# Patient Record
Sex: FEMALE | Race: WHITE | NOT HISPANIC OR LATINO | Employment: FULL TIME | URBAN - METROPOLITAN AREA
[De-identification: names, ages, dates, MRNs, and addresses within clinical notes are randomized per-mention and may not be internally consistent; named-entity substitution may affect disease eponyms.]

---

## 2018-01-11 ENCOUNTER — OFFICE VISIT CONVERTED (OUTPATIENT)
Dept: INTERNAL MEDICINE | Facility: CLINIC | Age: 43
End: 2018-01-11
Attending: NURSE PRACTITIONER

## 2018-02-20 ENCOUNTER — OFFICE VISIT CONVERTED (OUTPATIENT)
Dept: INTERNAL MEDICINE | Facility: CLINIC | Age: 43
End: 2018-02-20
Attending: PHYSICIAN ASSISTANT

## 2018-04-13 ENCOUNTER — OFFICE VISIT CONVERTED (OUTPATIENT)
Dept: INTERNAL MEDICINE | Facility: CLINIC | Age: 43
End: 2018-04-13
Attending: INTERNAL MEDICINE

## 2020-06-10 ENCOUNTER — TELEMEDICINE CONVERTED (OUTPATIENT)
Dept: INTERNAL MEDICINE | Facility: CLINIC | Age: 45
End: 2020-06-10
Attending: INTERNAL MEDICINE

## 2021-02-01 ENCOUNTER — CONVERSION ENCOUNTER (OUTPATIENT)
Dept: INTERNAL MEDICINE | Facility: CLINIC | Age: 46
End: 2021-02-01

## 2021-02-01 ENCOUNTER — OFFICE VISIT CONVERTED (OUTPATIENT)
Dept: INTERNAL MEDICINE | Facility: CLINIC | Age: 46
End: 2021-02-01
Attending: INTERNAL MEDICINE

## 2021-02-01 ENCOUNTER — HOSPITAL ENCOUNTER (OUTPATIENT)
Dept: OTHER | Facility: HOSPITAL | Age: 46
Discharge: HOME | End: 2021-02-01
Attending: INTERNAL MEDICINE

## 2021-02-01 LAB
ALBUMIN SERPL-MCNC: 4.3 G/DL (ref 3.5–5)
ALBUMIN/GLOB SERPL: 1.6 {RATIO} (ref 1.4–2.6)
ALP SERPL-CCNC: 63 U/L (ref 42–98)
ALT SERPL-CCNC: 12 U/L (ref 10–40)
ANION GAP SERPL CALC-SCNC: 14 MMOL/L (ref 8–19)
AST SERPL-CCNC: 14 U/L (ref 15–50)
BASOPHILS # BLD AUTO: 0.05 10*3/UL (ref 0–0.2)
BASOPHILS NFR BLD AUTO: 0.6 % (ref 0–3)
BILIRUB SERPL-MCNC: 0.2 MG/DL (ref 0.2–1.3)
BUN SERPL-MCNC: 10 MG/DL (ref 5–25)
BUN/CREAT SERPL: 11 {RATIO} (ref 6–20)
CALCIUM SERPL-MCNC: 9 MG/DL (ref 8.7–10.4)
CHLORIDE SERPL-SCNC: 105 MMOL/L (ref 99–111)
CHOLEST SERPL-MCNC: 203 MG/DL (ref 107–200)
CHOLEST/HDLC SERPL: 3 {RATIO} (ref 3–6)
CONV ABS IMM GRAN: 0.03 10*3/UL (ref 0–0.2)
CONV CO2: 24 MMOL/L (ref 22–32)
CONV IMMATURE GRAN: 0.4 % (ref 0–1.8)
CONV TOTAL PROTEIN: 7 G/DL (ref 6.3–8.2)
CREAT UR-MCNC: 0.91 MG/DL (ref 0.5–0.9)
DEPRECATED RDW RBC AUTO: 40.6 FL (ref 36.4–46.3)
EOSINOPHIL # BLD AUTO: 0.12 10*3/UL (ref 0–0.7)
EOSINOPHIL # BLD AUTO: 1.4 % (ref 0–7)
ERYTHROCYTE [DISTWIDTH] IN BLOOD BY AUTOMATED COUNT: 12.9 % (ref 11.7–14.4)
GFR SERPLBLD BASED ON 1.73 SQ M-ARVRAT: >60 ML/MIN/{1.73_M2}
GLOBULIN UR ELPH-MCNC: 2.7 G/DL (ref 2–3.5)
GLUCOSE SERPL-MCNC: 94 MG/DL (ref 65–99)
HCT VFR BLD AUTO: 40 % (ref 37–47)
HDLC SERPL-MCNC: 68 MG/DL (ref 40–60)
HGB BLD-MCNC: 12.8 G/DL (ref 12–16)
LDLC SERPL CALC-MCNC: 100 MG/DL (ref 70–100)
LYMPHOCYTES # BLD AUTO: 2.2 10*3/UL (ref 1–5)
LYMPHOCYTES NFR BLD AUTO: 26.1 % (ref 20–45)
MCH RBC QN AUTO: 27.8 PG (ref 27–31)
MCHC RBC AUTO-ENTMCNC: 32 G/DL (ref 33–37)
MCV RBC AUTO: 86.8 FL (ref 81–99)
MONOCYTES # BLD AUTO: 0.57 10*3/UL (ref 0.2–1.2)
MONOCYTES NFR BLD AUTO: 6.8 % (ref 3–10)
NEUTROPHILS # BLD AUTO: 5.45 10*3/UL (ref 2–8)
NEUTROPHILS NFR BLD AUTO: 64.7 % (ref 30–85)
NRBC CBCN: 0 % (ref 0–0.7)
OSMOLALITY SERPL CALC.SUM OF ELEC: 287 MOSM/KG (ref 273–304)
PLATELET # BLD AUTO: 363 10*3/UL (ref 130–400)
PMV BLD AUTO: 10.1 FL (ref 9.4–12.3)
POTASSIUM SERPL-SCNC: 4.4 MMOL/L (ref 3.5–5.3)
RBC # BLD AUTO: 4.61 10*6/UL (ref 4.2–5.4)
SODIUM SERPL-SCNC: 139 MMOL/L (ref 135–147)
TRIGL SERPL-MCNC: 174 MG/DL (ref 40–150)
TSH SERPL-ACNC: 4.32 M[IU]/L (ref 0.27–4.2)
VLDLC SERPL-MCNC: 35 MG/DL (ref 5–37)
WBC # BLD AUTO: 8.42 10*3/UL (ref 4.8–10.8)

## 2021-03-24 ENCOUNTER — OFFICE VISIT CONVERTED (OUTPATIENT)
Dept: FAMILY MEDICINE CLINIC | Facility: CLINIC | Age: 46
End: 2021-03-24
Attending: PHYSICIAN ASSISTANT

## 2021-03-24 ENCOUNTER — CONVERSION ENCOUNTER (OUTPATIENT)
Dept: FAMILY MEDICINE CLINIC | Facility: CLINIC | Age: 46
End: 2021-03-24

## 2021-03-29 ENCOUNTER — HOSPITAL ENCOUNTER (OUTPATIENT)
Dept: LAB | Facility: HOSPITAL | Age: 46
Discharge: HOME | End: 2021-03-29
Attending: PHYSICIAN ASSISTANT

## 2021-03-29 LAB
ALBUMIN SERPL-MCNC: 4.4 G/DL (ref 3.5–5)
ALBUMIN/GLOB SERPL: 1.7 {RATIO} (ref 1.4–2.6)
ALP SERPL-CCNC: 49 U/L (ref 42–98)
ALT SERPL-CCNC: 15 U/L (ref 10–40)
ANION GAP SERPL CALC-SCNC: 17 MMOL/L (ref 8–19)
AST SERPL-CCNC: 17 U/L (ref 15–50)
BASOPHILS # BLD AUTO: 0.04 10*3/UL (ref 0–0.2)
BASOPHILS NFR BLD AUTO: 0.5 % (ref 0–3)
BILIRUB SERPL-MCNC: 0.24 MG/DL (ref 0.2–1.3)
BUN SERPL-MCNC: 11 MG/DL (ref 5–25)
BUN/CREAT SERPL: 12 {RATIO} (ref 6–20)
CALCIUM SERPL-MCNC: 8.9 MG/DL (ref 8.7–10.4)
CHLORIDE SERPL-SCNC: 102 MMOL/L (ref 99–111)
CONV ABS IMM GRAN: 0.02 10*3/UL (ref 0–0.2)
CONV CO2: 22 MMOL/L (ref 22–32)
CONV IMMATURE GRAN: 0.2 % (ref 0–1.8)
CONV TOTAL PROTEIN: 7 G/DL (ref 6.3–8.2)
CREAT UR-MCNC: 0.93 MG/DL (ref 0.5–0.9)
DEPRECATED RDW RBC AUTO: 41.2 FL (ref 36.4–46.3)
EOSINOPHIL # BLD AUTO: 0.1 10*3/UL (ref 0–0.7)
EOSINOPHIL # BLD AUTO: 1.2 % (ref 0–7)
ERYTHROCYTE [DISTWIDTH] IN BLOOD BY AUTOMATED COUNT: 12.9 % (ref 11.7–14.4)
FOLATE SERPL-MCNC: 15.5 NG/ML (ref 4.8–20)
GFR SERPLBLD BASED ON 1.73 SQ M-ARVRAT: >60 ML/MIN/{1.73_M2}
GLOBULIN UR ELPH-MCNC: 2.6 G/DL (ref 2–3.5)
GLUCOSE SERPL-MCNC: 82 MG/DL (ref 65–99)
HCT VFR BLD AUTO: 38 % (ref 37–47)
HGB BLD-MCNC: 12.1 G/DL (ref 12–16)
LYMPHOCYTES # BLD AUTO: 2.02 10*3/UL (ref 1–5)
LYMPHOCYTES NFR BLD AUTO: 23.8 % (ref 20–45)
MCH RBC QN AUTO: 28 PG (ref 27–31)
MCHC RBC AUTO-ENTMCNC: 31.8 G/DL (ref 33–37)
MCV RBC AUTO: 88 FL (ref 81–99)
MONOCYTES # BLD AUTO: 0.51 10*3/UL (ref 0.2–1.2)
MONOCYTES NFR BLD AUTO: 6 % (ref 3–10)
NEUTROPHILS # BLD AUTO: 5.78 10*3/UL (ref 2–8)
NEUTROPHILS NFR BLD AUTO: 68.3 % (ref 30–85)
NRBC CBCN: 0 % (ref 0–0.7)
OSMOLALITY SERPL CALC.SUM OF ELEC: 282 MOSM/KG (ref 273–304)
PLATELET # BLD AUTO: 363 10*3/UL (ref 130–400)
PMV BLD AUTO: 10.5 FL (ref 9.4–12.3)
POTASSIUM SERPL-SCNC: 3.7 MMOL/L (ref 3.5–5.3)
RBC # BLD AUTO: 4.32 10*6/UL (ref 4.2–5.4)
SODIUM SERPL-SCNC: 137 MMOL/L (ref 135–147)
T4 FREE SERPL-MCNC: 1.3 NG/DL (ref 0.9–1.8)
TSH SERPL-ACNC: 4.16 M[IU]/L (ref 0.27–4.2)
VIT B12 SERPL-MCNC: 351 PG/ML (ref 211–911)
WBC # BLD AUTO: 8.47 10*3/UL (ref 4.8–10.8)

## 2021-04-26 ENCOUNTER — OFFICE VISIT CONVERTED (OUTPATIENT)
Dept: FAMILY MEDICINE CLINIC | Facility: CLINIC | Age: 46
End: 2021-04-26
Attending: PHYSICIAN ASSISTANT

## 2021-04-26 ENCOUNTER — CONVERSION ENCOUNTER (OUTPATIENT)
Dept: FAMILY MEDICINE CLINIC | Facility: CLINIC | Age: 46
End: 2021-04-26

## 2021-04-29 ENCOUNTER — OFFICE VISIT CONVERTED (OUTPATIENT)
Dept: FAMILY MEDICINE CLINIC | Facility: CLINIC | Age: 46
End: 2021-04-29
Attending: PHYSICIAN ASSISTANT

## 2021-05-10 NOTE — H&P
"   History and Physical      Patient Name: Angelita Morgan   Patient ID: [id number]   Sex: Female   YOB: 1975    Primary Care Provider: Tiesha Weir MD    Visit Date: February 1, 2021    Provider: Tiesha Weir MD   Location: [facility]   Location Address: [address] [place] [address], [address]  [place], [place]  [id number]   Location Phone: [phone]          Chief Complaint  · est care  · HTN  · back pain      History Of Present Illness  Angelita Morgan is a 45 year old Other Race female who presents for evaluation and treatment of:      Previous PCP: \"has been a while since seen\", however is a pt of Dr. Weir, last visit June 2020  Last blood work: Does not know, put in labs for visit today  Specialists: None  LMP: 01/08/2021  PAP: Has not had  Mammogram: Has not had  Influenza vaccine: Has not had, wants to get today  Eye exam: Had one around 2019  Dental exam: Had one around 2018  Tobacco history: Former 1ppd, quit in 2017    HTN: pt says her BP has been stable around 140/80, BP at visit is 162/114, painful to take BP due to fibromyalgia, Denies vision changes, chest pain, palpitations, shortness of breath, headache. Was recently seen at urgent care and BP was 130/78 at that time.     Back pain: located in lumbar region, pt says she hurt her back on the 18th of January, went to [place] urgent care - recieved muscle relaxer and antiinflammatory, did not help so she went to the ER, received much the same treatment. pain has improved but is still present. Still taking muscle relaxer at night. Denies numbness or tingling.     Wanting to discuss pain management referal due to fibromyalgia and arthritis.  Currently taking CBD oil, IBU, Tyenol daily.     Migraines: infrequent, takes Imitrex with good relief       Past Medical History  Disease Name Date Onset Notes   Allergies --  --    Anxiety --  --    Arthritis --  --    Back injury --  --    Broken Bones --  --    Convulsions --  --  "   Depression --  --    Fibromyalgia --  --    Forgetfulness --  --    Heart murmur --  --    Hypertension --  --    Migraine --  --    Thyroid Problems --  --          Past Surgical History  Procedure Name Date Notes   Vaginal Delivery/s --  --          Medication List  Name Date Started Instructions   acetaminophen 500 mg oral capsule  take 2 capsules (1,000 mg) by oral route every 6 hours as needed   acetaminophen 500 mg oral tablet  take 1 tablet by oral route every 6 hours   Blood Pressure Cuff miscellaneous misc 06/10/2020 use as directed   ibuprofen 200 mg oral tablet  take 1 tablet (200 mg) by oral route every 6 hours as needed with food   Imitrex 50 mg oral tablet 06/10/2020 take 1 tablet (50 mg) by oral route after onset of migraine; may repeat after 2 hours if headache returns, not to exceed 200mg in 24hrs   lisinopril 20 mg oral tablet 2021 take 1 tablet (20 mg) by oral route once daily for 30 days   loratadine 10 mg oral tablet  take 1 tablet (10 mg) by oral route once daily   melatonin 10 mg oral tablet  take 1 tablet by oral route once a day (at bedtime)   Vitamin C 500 mg oral tablet  take 1 tablet by oral route daily         Allergy List  Allergen Name Date Reaction Notes   clindamycin HCl --  --  --    Latex --  --  --    PENICILLINS --  --  --        Allergies Reconciled  Family Medical History  Disease Name Relative/Age Notes   Depression  --    Stroke  --    Alzheimer's Disease  --    Lung cancer  --    Ovarian Cancer, Family History  --    ADHD  --    Diabetes  --          Reproductive History  Menstrual   Pregnancy Summary   Total Pregnancies: 4 Full Term: 4 Premature: 0   Ab Induced: 0 Ab Spontaneous: 0 Ectopics: 0   Multiples: 0 Livin         Social History  Finding Status Start/Stop Quantity Notes   Tobacco Former  .25ppd quit           Immunizations  NameDate Admin Mfg Trade Name Lot Number Route Inj VIS Given VIS Publication   Pcyhjbmuc79/01/2021 [employer] Fluzone Quadrivalent  [id number] [facility] 02/01/2021 08/15/2019   Comments: pt tolerated well, left office in stable condition         Review of Systems  · Constitutional  o * See HPI  · Eyes  o * See HPI  · HENT  o * See HPI  · Breasts  o * See HPI  · Cardiovascular  o * See HPI  · Respiratory  o * See HPI  · Gastrointestinal  o * See HPI  · Genitourinary  o * See HPI  · Integument  o * See HPI  · Neurologic  o * See HPI  · Musculoskeletal  o * See HPI  · Endocrine  o * See HPI  · Psychiatric  o * See HPI  · Heme-Lymph  o * See HPI  · Allergic-Immunologic  o * See HPI      Vitals  Date Time BP Position Site L\R Cuff Size HR RR TEMP (F) WT  HT  BMI kg/m2 BSA m2 O2 Sat FR L/min FiO2 HC       02/20/2018 08:20 /130 Sitting                 04/13/2018 09:47 /60 Sitting    67 - R 16 99.3 262lbs 0oz 5'  9\" 38.69 2.41 99 %      02/01/2021 09:03 /114 Sitting    71 - R  97.6 284lbs 6oz 5'  9\" 41.99 2.51 98 %      02/01/2021 09:48 /106 Sitting                       Physical Examination  · Constitutional  o Appearance  o : no acute distress, well-nourished  · Head and Face  o Head  o :   § Inspection  § : atraumatic, normocephalic  · Eyes  o Eyes  o : extraocular movements intact, no scleral icterus, no conjunctival injection  · Ears, Nose, Mouth and Throat  o Ears  o :   § External Ears  § : normal  o Nose  o :   § Intranasal Exam  § : nares patent  o Oral Cavity  o :   § Oral Mucosa  § : moist mucous membranes  · Respiratory  o Respiratory Effort  o : breathing comfortably, symmetric chest rise  o Auscultation of Lungs  o : clear to asculatation bilaterally, no wheezes, rales, or rhonchii  · Cardiovascular  o Heart  o :   § Auscultation of Heart  § : regular rate and rhythm, no murmurs, rubs, or gallops  o Peripheral Vascular System  o :   § Extremities  § : no edema  · Neurologic  o Mental Status Examination  o :   § Orientation  § : grossly oriented to person, place and time  o Gait and Station  o :   § Gait Screening  § : " normal gait  · Psychiatric  o General  o : normal mood and affect              Assessment  · Screening for depression     V79.0/Z13.89  positive screen  patient attributes symptoms to chronic pain, does not wish to consider counseling or medication at this time  · Need for influenza vaccination     V04.81/Z23  · Anxiety     300.02/F41.1  · Fibromyalgia     729.1/M79.7  will refer to pain management per patient request  · Hypertension     401.9/I10  Elevated today in clinic, patient denies symptoms at this time, so will hold off on treating with clonidine  Will increase Lisinopril dose   Patient to monitor BP at home and call with continued elevated readings  patient to call clinic or go to ER if she develops any symptoms of HTN urgency  · Migraine     346.10/G43.009  well controlled  continue Imitrex as needed  · Chronic pain     338.29/G89.29      Plan  · Orders  o Immunization Admin Fee (Single) ([facility]) (80767) - V04.81/Z23 - 02/01/2021  o Fluzone Quadrivalent Vaccine, age 6 months + (67054) - V04.81/Z23 - 02/01/2021  o Physical, Primary Care Panel (CBC, CMP, Lipid, TSH) [facility] (97570, 41395, 44778, 28595) - 729.1/M79.7, 401.9/I10, 338.29/G89.29, 346.10/G43.009 - 02/01/2021  o ACO-39: Current medications updated and reviewed (1159F, ) - - 02/01/2021  o PAIN MANAGEMENT CONSULTATION (PAINM) - 729.1/M79.7, 338.29/G89.29 - 02/01/2021  · Medications  o lisinopril 20 mg oral tablet   SIG: take 1 tablet (20 mg) by oral route once daily for 30 days   DISP: (30) Tablet with 2 refills  Adjusted on 02/01/2021     o Medications have been Reconciled  o Transition of Care or Provider Policy  · Instructions  o Depression Screen completed and scanned into the EMR under the designated folder within the patient's documents.  o Today's PHQ-9 result is _15__  o Take all medications as prescribed/directed.  o Patient was educated/instructed on their diagnosis, treatment and medications prior to discharge from the clinic today.  o Call  the office with any concerns or questions.  · Disposition  o Follow up in 1 month  o Labs drawn in clinic  o Prescriptions sent electronically            Electronically Signed by: Tiesha Weir MD -Author on February 3, 2021 08:31:57 AM

## 2021-05-10 NOTE — H&P
"   History and Physical      Patient Name: Angelita Morgan   Patient ID: [id number]   Sex: Female   YOB: 1975    Primary Care Provider: Pat BARRERA   Referring Provider: Tiesha Weir MD    Visit Date: March 24, 2021    Provider: BRUCE Marie   Location: [facility]   Location Address: [address] [place] [address], [address]  [place], [place]  [id number]   Location Phone: [phone]          Chief Complaint  · New Patient-Establish Care       History Of Present Illness  Angelita Morgan is a 45 year old /White female who presents for evaluation and treatment of:      New Patient to establish care, previous PCP Tiesha Weir     She would like to discuss weight loss; weight gain of about 20 lb since 2018. Recently lost 10 lbs per pt. \"tries to eat healthy but hard due to work schedule\"    She is also c/o bilateral hand dryness that are cracking and bleeding. She states she has tried lotions and sauves without improvement. Sprays with hydrogen peroxide to help with healing. Sx x 2 years. Itching.    She is also concerned about her memory, she states she has been forgetting stuff alot    HTN: She is taking Lisinopril 10mg daily but states she feels like it hasn't been working. She has not been checking at home, today in office it is 186/96. She denies CP/SOA/Headache but states she does have edema in feet/ankles. She states she stands on her feet alot at work     AR: She is taking Loratidine with good results    Fibromyalgia: She is taking Acetaminophen and Ibuprofen daily and also uses CBD oil. She states she does not see anyone for management of it. She is wanting to discuss her pain today. Has referral to pain mgt pending from previous provider. Took medication in past unsure of name but had \"horrible side effects'    Mammo: Scheduled for 08/11/2021 [facility] @ 645pm   Labs: 02/01/2021, TSH elevated.  Pap: Unsure   PHQ: 15; pt states that she doesn't feel depressed " but overwhelmed with medical issues.           Past Medical History  Disease Name Date Onset Notes   Allergies --  --    Anxiety --  --    Arthritis --  --    Back injury --  --    Broken Bones --  --    Convulsions --  --    Depression --  --    Fibromyalgia --  --    Forgetfulness --  --    Heart murmur --  --    Hypertension --  --    Migraine --  --    Thyroid Problems --  --          Past Surgical History  Procedure Name Date Notes   Vaginal Delivery/s --  --          Allergy List  Allergen Name Date Reaction Notes   clindamycin HCl --  --  --    Latex --  --  --    PENICILLINS --  --  --        Allergies Reconciled  Family Medical History  Disease Name Relative/Age Notes   Depression  --    Stroke  --    Alzheimer's Disease  --    Lung cancer  --    Ovarian Cancer, Family History  --    ADHD  --    Diabetes  --          Reproductive History  Menstrual   Pregnancy Summary   Total Pregnancies: 4 Full Term: 4 Premature: 0   Ab Induced: 0 Ab Spontaneous: 0 Ectopics: 0   Multiples: 0 Livin         Social History  Finding Status Start/Stop Quantity Notes   Tobacco Former  .25ppd quit           Immunizations  NameDate Admin Mfg Trade Name Lot Number Route Inj VIS Given VIS Publication   Nbnjkwyev30/01/2021 [facility] Fluzone Quadrivalent [id number] IM RD 2021 08/15/2019   Comments: pt tolerated well, left office in stable condition         Review of Systems  · Constitutional  o Admits  o : weight gain  o Denies  o : fatigue, night sweats  · Eyes  o Denies  o : double vision, blurred vision  · HENT  o Denies  o : vertigo, recent head injury  · Breasts  o Denies  o : abnormal changes in breast size, additional breast symptoms except as noted in the HPI  · Cardiovascular  o Denies  o : chest pain, irregular heart beats  · Respiratory  o Denies  o : shortness of breath, productive cough  · Gastrointestinal  o Denies  o : nausea, vomiting  · Genitourinary  o Denies  o : dysuria, urinary  "retention  · Integument  o Admits  o : dry skin  o Denies  o : hair growth change, new skin lesions  · Neurologic  o Admits  o : Chronic pain  o Denies  o : altered mental status, seizures  · Musculoskeletal  o Denies  o : joint swelling, limitation of motion  · Endocrine  o Denies  o : cold intolerance, heat intolerance  · Psychiatric  o Admits  o : memory deficit  o Denies  o : depression  · Heme-Lymph  o Denies  o : petechiae, lymph node enlargement or tenderness  · Allergic-Immunologic  o Denies  o : frequent illnesses      Vitals  Date Time BP Position Site L\R Cuff Size HR RR TEMP (F) WT  HT  BMI kg/m2 BSA m2 O2 Sat FR L/min FiO2 HC       03/24/2021 09:37 /96 Sitting    81 - R  98.2 282lbs 2oz 5'  9\" 41.66 2.5 100 %  21%    03/24/2021 04:12 /90 Sitting                       Physical Examination  · Constitutional  o Appearance  o : well developed, well-nourished, obese in no acute distress  · Head and Face  o Head  o : normocephalic, atraumatic  · Neck  o Inspection/Palpation  o : normal appearance, no masses or tenderness, trachea midline  o Thyroid  o : gland size normal, nontender, no nodules or masses present on palpation  · Respiratory  o Respiratory Effort  o : breathing unlabored  o Inspection of Chest  o : chest rise symmetric bilaterally  o Auscultation of Lungs  o : clear to auscultation bilaterally throughout inspiration and expiration  · Cardiovascular  o Heart  o :   § Auscultation of Heart  § : regular rate and rhythm, murmur noted, gallops or rubs  o Peripheral Vascular System  o :   § Extremities  § : no edema  · Lymphatic  o Neck  o : no cervical lymphadenopathy, no supraclavicular lymphadenopathy  · Psychiatric  o Mood and Affect  o : mood normal, affect appropriate              Assessment  · Screening for depression     V79.0/Z13.89  · Allergic rhinitis due to allergen     477.9/J30.9  · Obesity     278.00/E66.9  · Fibromyalgia     729.1/M79.7  Referral to pain " management.  · Heart murmur     785.2/R01.1  · Hypertension     401.9/I10  · Patient receiving medication management services from refill clinic     V68.1/Z76.0  · Abnormal TSH     790.6/R79.89  · Weight gain     783.1/R63.5  · Dry skin     701.1/L85.3  · Memory deficit     780.93/R41.3       Increase Lisinopril to 20mg qd; f/u 1 mth to recheck.  Check labs; pt had elevated TSH; recheck with thyroid profile and b12.     Problems Reconciled  Plan  · Orders  o CBC with Auto Diff [facility] (71401) - 401.9/I10, 790.6/R79.89, 783.1/R63.5 - 03/24/2021  o CMP [facility] (63873) - 401.9/I10 - 03/24/2021  o Thyroid Profile (77707, 25875, THYII) - 401.9/I10, 278.00/E66.9, 790.6/R79.89, 783.1/R63.5 - 03/24/2021  o ACO-18: Positive screen for clinical depression using a standardized tool and a follow-up plan documented () - - 03/24/2021   Checking labs; will f/u 1 mth.  o ACO-14: Influenza immunization administered or previously received [facility] () - - 03/24/2021  o ACO-39: Current medications updated and reviewed (, 1159F) - - 03/24/2021  o B12 Folate levels (B12FO) - 278.00/E66.9, 780.93/R41.3 - 03/24/2021  o PAIN MANAGEMENT CONSULTATION (PAINM) - 729.1/M79.7 - 03/24/2021  · Medications  o lisinopril 20 mg oral tablet   SIG: take 1 tablet (20 mg) by oral route once daily for 30 days   DISP: (30) Tablet with 1 refills  Adjusted on 03/24/2021     o Medications have been Reconciled  o Transition of Care or Provider Policy  · Instructions  o Depression Screen completed and scanned into the EMR under the designated folder within the patient's documents.  o Today's PHQ-9 result is 15  o Take all medications as prescribed/directed.  o Patient was educated/instructed on their diagnosis, treatment and medications prior to discharge from the clinic today.  o Patient instructed to seek medical attention urgently for new or worsening symptoms.  o Call the office with any concerns or questions.  o Chronic conditions reviewed and taken into  consideration for today's treatment plan.  o Discussed Covid-19 precautions including, but not limited to, social distancing, avoid touching your face, and hand washing.   o Electronically Identified Patient Education Materials Provided Electronically  · Disposition  o Follow Up 1 month.            Electronically Signed by: BRUCE Marie -Author on March 24, 2021 04:24:30 PM

## 2021-05-13 NOTE — PROGRESS NOTES
"   Progress Note      Patient Name: Angelita Morgan   Patient ID: [id number]   Sex: Female   YOB: 1975    Primary Care Provider: Tiesha Weir MD    Visit Date: Kamryn 10, 2020    Provider: Tiesha Weir MD   Location: [facility]   Location Address: [address] [place] [address], [facility] [address]  [place], [place]  [id number]   Location Phone: [phone]          Chief Complaint  · \"wants to get bp meds started again\"  · \"150/90\"       History Of Present Illness  TELEHEALTH TELEPHONE VISIT  Chief Complaint: elevated blood pressure, migraine   Angelita Morgan is a 45 year old Other Race female who is presenting for evaluation via telehealth telephone visit. Verbal consent obtained before beginning visit.   Provider spent 11 minutes with the patient during the telehealth visit.   The following staff were present during this visit: Tiesha Weir MD   Past Medical History/ Overview of Patient Symptoms     unsure of when last pap and mammo were  also, states felt better on bp meds, just never got them refilled    for the past 3 months has been noticing elevated blood pressure, running 150/90  checking at home every 3 days, now BP cuff is broken, needs new one  previously on Lisinopril, tolerated well    C/O migraines, has had multiple ER visits, has never been on medication for this       Past Medical History  Disease Name Date Onset Notes   Allergies --  --    Anxiety --  --    Arthritis --  --    Back injury --  --    Broken Bones --  --    Convulsions --  --    Depression --  --    Fibromyalgia --  --    Forgetfulness --  --    Heart murmur --  --    Hypertension --  --    Migraine --  --    Thyroid Problems --  --          Past Surgical History  Procedure Name Date Notes   Vaginal Delivery/s --  --          Medication List  Name Date Started Instructions   ibuprofen 800 mg oral tablet 07/24/2017 take 1 tablet (800 mg) by oral route 3 times per day with food   Vitamin C 500 mg oral tablet  take 1 " tablet by oral route daily   Zyrtec 10 mg oral tablet 2018 take 1 tablet (10 mg) by oral route once daily for 30 days         Allergy List  Allergen Name Date Reaction Notes   clindamycin HCl --  --  --    Latex --  --  --    PENICILLINS --  --  --          Family Medical History  Disease Name Relative/Age Notes   Depression  --    Stroke  --    Alzheimer's Disease  --    Ovarian Cancer, Family History  --    ADHD  --    Diabetes  --          Reproductive History  Menstrual   Pregnancy Summary   Total Pregnancies: 4 Full Term: 4 Premature: 0   Ab Induced: 0 Ab Spontaneous: 0 Ectopics: 0   Multiples: 0 Livin         Social History  Finding Status Start/Stop Quantity Notes   Tobacco Former  .25ppd --          Immunizations  NameDate Admin Mfg Trade Name Lot Number Route Inj VIS Given VIS Publication   Znveehsta10/2017 [employer] Fluarix, quadrivalent, preservative free [id number] IM LD 10/04/2017 2014   Comments: Patient tolerated well, left office in stable condition.         Review of Systems  · Constitutional  o Denies  o : fatigue, fever, weight gain, weight loss, chills  · Eyes  o Denies  o : changes in vision  · HENT  o Admits  o : headaches  o Denies  o : ear pain, sore throat  · Cardiovascular  o Denies  o : chest Pain, palpitations, edema (swelling)  · Respiratory  o Denies  o : frequent cough, shortness of breath  · Gastrointestinal  o Denies  o : nausea, vomiting, changes in bowel habits      Physical Examination                Assessment  · Hypertension     401.9/I10  restart Lisinopril  advised to check BP at home  follow up in 1 month  · Migraine     346.10/G43.009  will send in Imitrex  · Encounter for screening mammogram for breast cancer         Problems Reconciled  Plan  · Orders  o Physician Telephone Evaluation, 11-20 minutes (24687) - , 401.9/I10, 346.10/G43.009 - 06/10/2020  o Screening Mammogram 3D Bilateral (68599, 56916, ) -  -  06/10/2020  · Medications  o lisinopril 10 mg oral tablet   SIG: take 1 tablet (10 mg) by oral route once daily   DISP: (30) tablets with 1 refills  Prescribed on 06/10/2020     o Blood Pressure Cuff miscellaneous misc   SIG: use as directed   DISP: (1) Box with 0 refills  Prescribed on 06/10/2020     o Imitrex 50 mg oral tablet   SIG: take 1 tablet (50 mg) by oral route after onset of migraine; may repeat after 2 hours if headache returns, not to exceed 200mg in 24hrs   DISP: (9) tablets with 1 refills  Adjusted on 06/10/2020     o Medications have been Reconciled  o Transition of Care or Provider Policy  · Instructions  o Plan Of Care:   o Take all medications as prescribed/directed.  o Call the office with any concerns or questions.  · Disposition  o Follow up in 1 month  o Prescriptions sent electronically  o Radiology Order to be scheduled            Electronically Signed by: Tiesha Weir MD -Author on Kamryn 10, 2020 02:37:38 PM

## 2021-05-14 VITALS
HEIGHT: 69 IN | DIASTOLIC BLOOD PRESSURE: 114 MMHG | TEMPERATURE: 97.6 F | SYSTOLIC BLOOD PRESSURE: 162 MMHG | BODY MASS INDEX: 42.12 KG/M2 | WEIGHT: 284.37 LBS | HEART RATE: 71 BPM | OXYGEN SATURATION: 98 %

## 2021-05-14 VITALS
BODY MASS INDEX: 41.36 KG/M2 | OXYGEN SATURATION: 98 % | WEIGHT: 279.25 LBS | HEIGHT: 69 IN | DIASTOLIC BLOOD PRESSURE: 100 MMHG | HEART RATE: 108 BPM | TEMPERATURE: 98.5 F | SYSTOLIC BLOOD PRESSURE: 165 MMHG

## 2021-05-14 VITALS
OXYGEN SATURATION: 99 % | HEIGHT: 69 IN | TEMPERATURE: 98.8 F | BODY MASS INDEX: 41.4 KG/M2 | DIASTOLIC BLOOD PRESSURE: 90 MMHG | WEIGHT: 279.5 LBS | SYSTOLIC BLOOD PRESSURE: 140 MMHG | HEART RATE: 81 BPM

## 2021-05-14 VITALS
HEIGHT: 69 IN | OXYGEN SATURATION: 100 % | TEMPERATURE: 98.2 F | WEIGHT: 282.12 LBS | HEART RATE: 81 BPM | DIASTOLIC BLOOD PRESSURE: 96 MMHG | SYSTOLIC BLOOD PRESSURE: 186 MMHG | BODY MASS INDEX: 41.79 KG/M2

## 2021-05-14 VITALS — SYSTOLIC BLOOD PRESSURE: 134 MMHG | DIASTOLIC BLOOD PRESSURE: 90 MMHG

## 2021-05-14 VITALS — DIASTOLIC BLOOD PRESSURE: 98 MMHG | SYSTOLIC BLOOD PRESSURE: 138 MMHG

## 2021-05-14 NOTE — PROGRESS NOTES
Progress Note      Patient Name: Angelita Morgan   Patient ID: [id number]   Sex: Female   YOB: 1975    Primary Care Provider: Pat BARRERA   Referring Provider: Tiesha Weir MD    Visit Date: April 29, 2021    Provider: BRUCE Marie   Location: [facility]   Location Address: [address] [place] [address], [address]  [place], [place]  [id number]   Location Phone: [phone]          Chief Complaint  · follow up       History Of Present Illness  Angelita Morgan is a 46 year old /White female who presents for evaluation and treatment of:      patient is here today to follow up on cellulitis of right index finger. She states it is much better and no additional concerns today; she is ready to return to work tomorrow. She was given 7 days of Keflex and will finish on Saturday.    HTN: increased Lisinopril to 40mg at last ov on Monday; bp still elevated today at 140/90; recheck was 150/90. Patient denies CP, SOA and edema.       Past Medical History  Disease Name Date Onset Notes   Anxiety --  --    Arthritis --  --    Essential hypertension 04/26/2021 --    Fibromyalgia --  --    Heart murmur --  --    Migraine --  --          Past Surgical History  Procedure Name Date Notes   Vaginal Delivery/s --  --          Medication List  Name Date Started Instructions   acetaminophen 500 mg oral capsule  take 1 - 2 capsules (500 - 1,000 mg) by oral route every 4-6 hours as needed not to exceed 8 capsules per 24hrs   cephalexin 500 mg oral capsule  take 1 capsule (500 mg) by oral route every 6 hours for 7 days   ibuprofen 200 mg oral tablet  take 1 tablet (200 mg) by oral route every 6 hours as needed with food   lisinopril 40 mg oral tablet 04/26/2021 take 1 tablet (40 mg) by oral route once daily for 30 days   loratadine 10 mg oral tablet  take 1 tablet (10 mg) by oral route once daily   melatonin 10 mg oral tablet  take 1 tablet by oral route once a day (at bedtime)  "        Allergy List  Allergen Name Date Reaction Notes   clindamycin HCl --  --  --    Latex --  --  --    PENICILLINS --  --  --        Allergies Reconciled  Family Medical History  Disease Name Relative/Age Notes   Depression  --    Stroke  --    Alzheimer's Disease  --    Lung cancer  --    Ovarian Cancer, Family History  --    ADHD  --    Diabetes  --          Reproductive History  Menstrual   Pregnancy Summary   Total Pregnancies: 4 Full Term: 4 Premature: 0   Ab Induced: 0 Ab Spontaneous: 0 Ectopics: 0   Multiples: 0 Livin         Social History  Finding Status Start/Stop Quantity Notes   Tobacco Former  .25ppd quit           Immunizations  NameDate Admin Mfg Trade Name Lot Number Route Inj VIS Given VIS Publication   Ydaiahlit26/01/2021 [facility] Fluzone Quadrivalent [id number] IM RD 2021 08/15/2019   Comments: pt tolerated well, left office in stable condition         Review of Systems  · Constitutional  o Denies  o : fever, fatigue, weight loss, weight gain  · Cardiovascular  o Denies  o : lower extremity edema, claudication, chest pressure, palpitations  · Respiratory  o Denies  o : shortness of breath, wheezing, cough, hemoptysis, dyspnea on exertion  · Gastrointestinal  o Denies  o : nausea, vomiting, diarrhea, constipation, abdominal pain      Vitals  Date Time BP Position Site L\R Cuff Size HR RR TEMP (F) WT  HT  BMI kg/m2 BSA m2 O2 Sat FR L/min FiO2 HC       2021 09:27 /90 Sitting    81 - R  98.8 279lbs 8oz 5'  9\" 41.27 2.48 99 %  21%    2021 09:40 /90 Sitting                       Physical Examination  · Constitutional  o Appearance  o : well developed, well-nourished, obese in no acute distress  · Head and Face  o Head  o : normocephalic, atraumatic  · Neck  o Inspection/Palpation  o : normal appearance, no masses or tenderness, trachea midline  o Thyroid  o : gland size normal, nontender, no nodules or masses present on palpation  · Respiratory  o Respiratory " Effort  o : breathing unlabored  o Inspection of Chest  o : chest rise symmetric bilaterally  o Auscultation of Lungs  o : clear to auscultation bilaterally throughout inspiration and expiration  · Cardiovascular  o Heart  o :   § Auscultation of Heart  § : regular rate and rhythm, no murmurs, gallops or rubs  o Peripheral Vascular System  o :   § Extremities  § : no edema  · Lymphatic  o Neck  o : no cervical lymphadenopathy, no supraclavicular lymphadenopathy  · Psychiatric  o Mood and Affect  o : mood normal, affect appropriate     right 2nd digit: still with redness and swelling but much improved; good range of motion; cap refill normal.           Assessment  · Essential hypertension     401.9/I10  Continue current bp medication and f/u in 1mth.  · Cellulitis     682.9/L03.90  Extend Keflex 500mg bid for 3 additional days; ok to return to work.    Problems Reconciled  Plan  · Orders  o ACO-39: Current medications updated and reviewed (, 1159F) - - 04/29/2021  · Medications  o Keflex 500 mg oral capsule   SIG: take 1 capsule (500 mg) by oral route every 12 hours for 3 days   DISP: (6) Capsule with 0 refills  Prescribed on 04/29/2021     o Medications have been Reconciled  o Transition of Care or Provider Policy  · Instructions  o Patient advised to monitor blood pressure (B/P) at home and journal readings. Patient informed that a B/P reading at home of more than 130/80 is considered hypertension. For readings greater tvgl504/90 or higher patient is advised to follow up in the office with readings for management. Patient advised to limit sodium intake.  o Patient is taking medications as prescribed and doing well.   o Patient was educated/instructed on their diagnosis, treatment and medications prior to discharge from the clinic today.  o Call the office with any concerns or questions.  o Chronic conditions reviewed and taken into consideration for today's treatment plan.  o Electronically Identified Patient  Education Materials Provided Electronically  · Disposition  o Follow Up 1 month.            Electronically Signed by: BRUCE Marie -Author on April 29, 2021 09:43:18 AM

## 2021-05-14 NOTE — PROGRESS NOTES
Progress Note      Patient Name: Angelita Morgan   Patient ID: [id number]   Sex: Female   YOB: 1975    Primary Care Provider: Pat BARRERA   Referring Provider: Tiesha Weir MD    Visit Date: April 26, 2021    Provider: BRUCE Marie   Location: [facility]   Location Address: [address] [place] [address], [address]  [place], [place]  [id number]   Location Phone: [phone]          Chief Complaint  · follow up   · medication refill       History Of Present Illness  Angelita Morgan is a 45 year old /White female who presents for evaluation and treatment of:      patient is here today for follow up and medication refill     She also was seen at [facility] ER for right index finger cellullitis. She is currently on Cephalexin. Thinks that her sx started after a scratch on her finger. ER records and xray reviewed. Pt is using heat and soaks. She works at [employer] and needs time off.    HTN: She is taking Lisinopril 20mg (increased from 10mg at last ov) and doing ok. She does not check her BP at home due to it hurting too bad from her Fibromyalgia. Today in office it is 165/100; recheck is 138/98. She denies CP/SOA/Edema but states she had a migraine on Saturday.     AR: She is taking Loratidine with good results     Fibromyalgia: She is taking Tylenol and Ibruprofen daily and uses CBD oil.     Labs reviewed: B12 borderline low. States that she tried OTC b12 for 1 day and had generalized pain therefore stopped.           Past Medical History  Disease Name Date Onset Notes   Anxiety --  --    Arthritis --  --    Fibromyalgia --  --    Heart murmur --  --    Migraine --  --          Past Surgical History  Procedure Name Date Notes   Vaginal Delivery/s --  --          Medication List  Name Date Started Instructions   acetaminophen 500 mg oral capsule  take 1 - 2 capsules (500 - 1,000 mg) by oral route every 4-6 hours as needed not to exceed 8 capsules per 24hrs   cephalexin 500  mg oral capsule  take 1 capsule (500 mg) by oral route every 6 hours for 7 days   ibuprofen 200 mg oral tablet  take 1 tablet (200 mg) by oral route every 6 hours as needed with food   lisinopril 20 mg oral tablet 2021 take 1 tablet (20 mg) by oral route once daily for 30 days   loratadine 10 mg oral tablet  take 1 tablet (10 mg) by oral route once daily   melatonin 10 mg oral tablet  take 1 tablet by oral route once a day (at bedtime)         Allergy List  Allergen Name Date Reaction Notes   clindamycin HCl --  --  --    Latex --  --  --    PENICILLINS --  --  --        Allergies Reconciled  Family Medical History  Disease Name Relative/Age Notes   Depression  --    Stroke  --    Alzheimer's Disease  --    Lung cancer  --    Ovarian Cancer, Family History  --    ADHD  --    Diabetes  --          Reproductive History  Menstrual   Pregnancy Summary   Total Pregnancies: 4 Full Term: 4 Premature: 0   Ab Induced: 0 Ab Spontaneous: 0 Ectopics: 0   Multiples: 0 Livin         Social History  Finding Status Start/Stop Quantity Notes   Tobacco Former  .25ppd quit           Immunizations  NameDate Admin Mfg Trade Name Lot Number Route Inj VIS Given VIS Publication   Rayxoredo53/01/2021 [facility] Fluzone Quadrivalent [id number] IM RD 2021 08/15/2019   Comments: pt tolerated well, left office in stable condition         Review of Systems  · Constitutional  o Denies  o : fever, fatigue, weight loss, weight gain  · Cardiovascular  o Denies  o : lower extremity edema, claudication, chest pressure, palpitations  · Respiratory  o Denies  o : shortness of breath, wheezing, cough, hemoptysis, dyspnea on exertion  · Gastrointestinal  o Denies  o : nausea, vomiting, diarrhea, constipation, abdominal pain  · Integument  o Admits  o : new skin lesions      Vitals  Date Time BP Position Site L\R Cuff Size HR RR TEMP (F) WT  HT  BMI kg/m2 BSA m2 O2 Sat FR L/min FiO2 HC       2021 08:34 /100 Sitting    108 - R   "98.5 279lbs 4oz 5'  9\" 41.24 2.48 98 %  21%    04/26/2021 04:10 /98 Sitting                       Physical Examination  · Constitutional  o Appearance  o : well developed, well-nourished, no acute distress  · Head and Face  o Head  o : normocephalic, atraumatic  · Neck  o Inspection/Palpation  o : normal appearance, no masses or tenderness, trachea midline  o Thyroid  o : gland size normal, nontender, no nodules or masses present on palpation  · Respiratory  o Respiratory Effort  o : breathing unlabored  o Inspection of Chest  o : chest rise symmetric bilaterally  o Auscultation of Lungs  o : clear to auscultation bilaterally throughout inspiration and expiration  · Cardiovascular  o Heart  o :   § Auscultation of Heart  § : regular rate and rhythm, no murmurs, gallops or rubs  o Peripheral Vascular System  o :   § Extremities  § : no edema  · Lymphatic  o Neck  o : no cervical lymphadenopathy, no supraclavicular lymphadenopathy  · Psychiatric  o Mood and Affect  o : mood normal, affect appropriate     Right index finger red and swollen with pustules; decreased range of motion. Good cap refill.           Assessment  · Essential hypertension     401.9/I10  Increase Lisinopril to 40mg qd; f/u in 1mth. Decrease sodium in diet. Work on diet and weight loss.  · Fibromyalgia     729.1/M79.7  Stable.  · Patient receiving medication management services from refill clinic     V68.1/Z76.0  · Cellulitis     682.9/L03.90  Continue antibiotic. Off work til Thursday; will recheck then.  · B12 deficiency     266.2/E53.8  Restart b12 1000mcg Sublingual qd.    Problems Reconciled  Plan  · Orders  o ACO-39: Current medications updated and reviewed (, 1159F) - - 04/26/2021  · Medications  o lisinopril 40 mg oral tablet   SIG: take 1 tablet (40 mg) by oral route once daily for 30 days   DISP: (30) Tablet with 1 refills  Adjusted on 04/26/2021     o Medications have been Reconciled  o Transition of Care or Provider " Policy  · Instructions  o Patient advised to monitor blood pressure (B/P) at home and journal readings. Patient informed that a B/P reading at home of more than 130/80 is considered hypertension. For readings greater thbp836/90 or higher patient is advised to follow up in the office with readings for management. Patient advised to limit sodium intake.  o Take all medications as prescribed/directed.  o Patient was educated/instructed on their diagnosis, treatment and medications prior to discharge from the clinic today.  o Chronic conditions reviewed and taken into consideration for today's treatment plan.  o Electronically Identified Patient Education Materials Provided Electronically  · Disposition  o Follow Up 1 week.            Electronically Signed by: BRUCE Marie -Author on April 26, 2021 04:16:29 PM

## 2021-05-16 VITALS
DIASTOLIC BLOOD PRESSURE: 90 MMHG | BODY MASS INDEX: 39.25 KG/M2 | WEIGHT: 265 LBS | RESPIRATION RATE: 15 BRPM | HEART RATE: 70 BPM | OXYGEN SATURATION: 98 % | TEMPERATURE: 98.9 F | SYSTOLIC BLOOD PRESSURE: 138 MMHG | HEIGHT: 69 IN

## 2021-05-16 VITALS
TEMPERATURE: 99.3 F | BODY MASS INDEX: 38.8 KG/M2 | RESPIRATION RATE: 16 BRPM | DIASTOLIC BLOOD PRESSURE: 60 MMHG | WEIGHT: 262 LBS | OXYGEN SATURATION: 99 % | HEART RATE: 67 BPM | HEIGHT: 69 IN | SYSTOLIC BLOOD PRESSURE: 136 MMHG

## 2021-05-16 VITALS
HEIGHT: 66 IN | WEIGHT: 268 LBS | SYSTOLIC BLOOD PRESSURE: 164 MMHG | OXYGEN SATURATION: 100 % | HEART RATE: 74 BPM | TEMPERATURE: 98.8 F | BODY MASS INDEX: 43.07 KG/M2 | DIASTOLIC BLOOD PRESSURE: 116 MMHG | RESPIRATION RATE: 16 BRPM

## 2021-06-11 DIAGNOSIS — I10 ESSENTIAL HYPERTENSION: Primary | ICD-10-CM

## 2021-06-11 PROBLEM — G43.909 HEADACHE, MIGRAINE: Status: ACTIVE | Noted: 2021-06-11

## 2021-06-11 PROBLEM — M79.7 FIBROMYALGIA: Status: ACTIVE | Noted: 2021-06-11

## 2021-06-11 PROBLEM — R01.1 HEART MURMUR: Status: ACTIVE | Noted: 2021-06-11

## 2021-06-11 PROBLEM — M19.90 ARTHRITIS: Status: ACTIVE | Noted: 2021-06-11

## 2021-06-11 PROBLEM — F41.9 ANXIETY: Status: ACTIVE | Noted: 2021-06-11

## 2021-06-11 RX ORDER — LISINOPRIL 40 MG/1
TABLET ORAL
Qty: 30 TABLET | Refills: 1 | Status: SHIPPED | OUTPATIENT
Start: 2021-06-11 | End: 2021-07-12

## 2021-06-11 RX ORDER — LISINOPRIL 40 MG/1
TABLET ORAL
COMMUNITY
Start: 2021-05-19 | End: 2021-06-11 | Stop reason: SDUPTHER

## 2021-06-23 RX ORDER — PHENOL 1.4 %
1 AEROSOL, SPRAY (ML) MUCOUS MEMBRANE DAILY
COMMUNITY
End: 2021-10-11

## 2021-06-23 RX ORDER — LORATADINE 10 MG/1
1 TABLET ORAL DAILY
COMMUNITY

## 2021-06-24 ENCOUNTER — OFFICE VISIT (OUTPATIENT)
Dept: FAMILY MEDICINE CLINIC | Facility: CLINIC | Age: 46
End: 2021-06-24

## 2021-06-24 VITALS
TEMPERATURE: 97.7 F | HEART RATE: 71 BPM | BODY MASS INDEX: 40.32 KG/M2 | SYSTOLIC BLOOD PRESSURE: 160 MMHG | WEIGHT: 272.25 LBS | OXYGEN SATURATION: 99 % | HEIGHT: 69 IN | DIASTOLIC BLOOD PRESSURE: 90 MMHG

## 2021-06-24 DIAGNOSIS — R05.9 COUGH: ICD-10-CM

## 2021-06-24 DIAGNOSIS — I10 ESSENTIAL HYPERTENSION: Primary | Chronic | ICD-10-CM

## 2021-06-24 DIAGNOSIS — E66.01 CLASS 3 SEVERE OBESITY WITH SERIOUS COMORBIDITY AND BODY MASS INDEX (BMI) OF 40.0 TO 44.9 IN ADULT, UNSPECIFIED OBESITY TYPE (HCC): Chronic | ICD-10-CM

## 2021-06-24 DIAGNOSIS — J31.0 CHRONIC RHINITIS: Chronic | ICD-10-CM

## 2021-06-24 DIAGNOSIS — M79.641 PAIN IN BOTH HANDS: ICD-10-CM

## 2021-06-24 DIAGNOSIS — M79.642 PAIN IN BOTH HANDS: ICD-10-CM

## 2021-06-24 PROCEDURE — 99214 OFFICE O/P EST MOD 30 MIN: CPT | Performed by: PHYSICIAN ASSISTANT

## 2021-06-24 RX ORDER — MONTELUKAST SODIUM 10 MG/1
10 TABLET ORAL NIGHTLY
Qty: 90 TABLET | Refills: 1 | Status: SHIPPED | OUTPATIENT
Start: 2021-06-24 | End: 2021-10-11

## 2021-06-24 RX ORDER — AMLODIPINE BESYLATE 5 MG/1
5 TABLET ORAL DAILY
Qty: 30 TABLET | Refills: 2 | Status: SHIPPED | OUTPATIENT
Start: 2021-06-24 | End: 2021-07-01

## 2021-06-24 NOTE — PROGRESS NOTES
Chief Complaint  Chief Complaint   Patient presents with   • Hand Pain     bilateral hand pain        Subjective          Angelita Morgan presents to [facility]  Hand Pain   The incident occurred more than 1 week ago. The incident occurred at work. The injury mechanism was repetitive motion. The pain is present in the left hand and right hand. The quality of the pain is described as cramping. The pain does not radiate. The pain is at a severity of 10/10. The pain is moderate. The pain has been constant since the incident. Pertinent negatives include no chest pain, muscle weakness, numbness or tingling. The symptoms are aggravated by movement. She has tried elevation, ice, NSAIDs, heat, rest and acetaminophen for the symptoms. The treatment provided no relief.   Cough  This is a recurrent problem. The current episode started 1 to 4 weeks ago. The problem has been gradually worsening. The problem occurs every few hours. The cough is non-productive. Associated symptoms include nasal congestion, postnasal drip and wheezing. Pertinent negatives include no chest pain, chills, fever, heartburn or shortness of breath. The symptoms are aggravated by other. She has tried nothing for the symptoms.         Patient is here today c/o bilateral hand pain that started about 2 weeks ago when she switched positions at work. She states she is constantly gripping and states her hands hurt all the time. Freezing water bottle and applying to hand is the only thing that minimally works for pain.    HTN: blood pressure is elevated today; on Lisinopril 40mg and is consistent with medication. She does note some edema in her right lower leg; wearing compression socks but is standing more at work with new positions. Denies chest pain and shortness of air. Patient does monitor sodium in diet.        Medical History: has a past medical history of Anxiety, Arthritis, Diabetes (CMS/[facility]), Essential hypertension  "(04/26/2021), Fibromyalgia, Heart murmur, and Migraine.   Surgical History: has a past surgical history that includes Vaginal delivery.   Family History: family history includes ADD / ADHD in an other family member; Alzheimer's disease in an other family member; Depression in an other family member; Diabetes in an other family member; Lung cancer in an other family member; Ovarian cancer in an other family member; Stroke in an other family member.   Social History: reports that she quit smoking about 4 years ago. She started smoking about 27 years ago. She smoked 0.25 packs per day. She has never used smokeless tobacco. She reports that she does not drink alcohol and does not use drugs.    Allergies: Clindamycin hcl, Latex, and Penicillins    Health Maintenance Due   Topic Date Due   • ANNUAL PHYSICAL  Never done   • COVID-19 Vaccine (1) Never done   • TDAP/TD VACCINES (1 - Tdap) Never done   • HEPATITIS C SCREENING  Never done   • PAP SMEAR  Never done       Immunization History   Administered Date(s) Administered   • Flu Vaccine Quad PF >18YRS 02/01/2021   • Flulaval/Fluarix/Fluzone Quad 10/04/2017   • Influenza TIV (IM) 10/04/2017   • Influenza, Unspecified 02/01/2021       Objective     Vitals:    06/24/21 0953 06/24/21 0955 06/24/21 1047   BP: (!) 187/98 162/93 160/90   Cuff Size: Adult Large Adult    Pulse: 71     Temp: 97.7 °F (36.5 °C)     TempSrc: Oral     SpO2: 99%     Weight: 123 kg (272 lb 4 oz)     Height: 175.3 cm (69\")       Body mass index is 40.2 kg/m².       Physical Exam  Vitals and nursing note reviewed.   Constitutional:       Appearance: Normal appearance. She is obese.   HENT:      Head: Normocephalic and atraumatic.      Right Ear: Tympanic membrane normal.      Left Ear: Tympanic membrane normal.      Nose: Congestion present.      Mouth/Throat:      Mouth: Mucous membranes are moist.   Neck:      Vascular: No carotid bruit.      Comments: Thyroid : gland size normal, nontender, no nodules " or masses present on palpation   Cardiovascular:      Rate and Rhythm: Normal rate and regular rhythm.      Pulses: Normal pulses.      Heart sounds: Normal heart sounds.   Pulmonary:      Effort: Pulmonary effort is normal.      Breath sounds: Normal breath sounds.   Musculoskeletal:      Cervical back: Neck supple. No tenderness.      Right lower leg: No edema.      Left lower leg: No edema.      Comments: Bilateral hands without redness or swelling; neurovascularly intact; mild tenderness to palpation. Good  and good range of motion.   Lymphadenopathy:      Cervical: No cervical adenopathy.   Neurological:      Mental Status: She is alert.   Psychiatric:         Mood and Affect: Mood normal.         Behavior: Behavior normal.           Result Review :                             Assessment and Plan      Diagnoses and all orders for this visit:    1. Essential hypertension (Primary)  Comments:  Not controlled; add Norvasc 5mg to current regimen. F/u 1 mth to re-evaluate; reminded of low sodium diet.  Orders:  -     amLODIPine (Norvasc) 5 MG tablet; Take 1 tablet by mouth Daily.  Dispense: 30 tablet; Refill: 2  -     Diclofenac Sodium (VOLTAREN) 1 % gel gel; Apply 4 g topically to the appropriate area as directed 4 (Four) Times a Day As Needed (pain).  Dispense: 150 g; Refill: 1    2. Cough  Comments:  Trial of adding Singulair to current allergy regimen; consider Lisinopril as cause of cough but due to timing, I think its more allergy related.  Orders:  -     montelukast (Singulair) 10 MG tablet; Take 1 tablet by mouth Every Night.  Dispense: 90 tablet; Refill: 1    3. Pain in both hands  Comments:  Trial of voltaren gel and continue to exercise hands and stretch; if no improvement consider PT referral.    4. Class 3 severe obesity with serious comorbidity and body mass index (BMI) of 40.0 to 44.9 in adult, unspecified obesity type (CMS/HCC)  Comments:  Improving; pt working on diet changes and increased  exercise; continue to monitor at f/u appts.    5. Chronic rhinitis  Comments:  Worsening; trial of adding Singulair to current regimen; f/u 1 mth.  Orders:  -     montelukast (Singulair) 10 MG tablet; Take 1 tablet by mouth Every Night.  Dispense: 90 tablet; Refill: 1            Follow Up     Return in about 4 weeks (around 7/22/2021) for Recheck.    Patient was given instructions and counseling regarding her condition or for health maintenance advice. Please see specific information pulled into the AVS if appropriate.

## 2021-06-30 DIAGNOSIS — I10 ESSENTIAL HYPERTENSION: Chronic | ICD-10-CM

## 2021-07-01 RX ORDER — AMLODIPINE BESYLATE 5 MG/1
TABLET ORAL
Qty: 90 TABLET | Refills: 0 | Status: SHIPPED | OUTPATIENT
Start: 2021-07-01 | End: 2021-10-11

## 2021-07-01 RX ORDER — LISINOPRIL 10 MG/1
TABLET ORAL
Qty: 90 TABLET | Refills: 1 | Status: SHIPPED | OUTPATIENT
Start: 2021-07-01 | End: 2021-07-12 | Stop reason: SDUPTHER

## 2021-07-01 RX ORDER — IBUPROFEN 400 MG/1
400 TABLET ORAL 2 TIMES DAILY PRN
COMMUNITY

## 2021-07-12 DIAGNOSIS — I10 ESSENTIAL HYPERTENSION: ICD-10-CM

## 2021-07-12 RX ORDER — LISINOPRIL 40 MG/1
TABLET ORAL
Qty: 30 TABLET | Refills: 1 | Status: SHIPPED | OUTPATIENT
Start: 2021-07-12 | End: 2021-08-11

## 2021-08-10 DIAGNOSIS — I10 ESSENTIAL HYPERTENSION: ICD-10-CM

## 2021-08-11 RX ORDER — LISINOPRIL 40 MG/1
TABLET ORAL
Qty: 30 TABLET | Refills: 0 | Status: SHIPPED | OUTPATIENT
Start: 2021-08-11 | End: 2021-09-14

## 2021-09-14 DIAGNOSIS — I10 ESSENTIAL HYPERTENSION: ICD-10-CM

## 2021-09-14 RX ORDER — LISINOPRIL 40 MG/1
TABLET ORAL
Qty: 30 TABLET | Refills: 0 | Status: SHIPPED | OUTPATIENT
Start: 2021-09-14 | End: 2021-10-11 | Stop reason: SDUPTHER

## 2021-10-11 ENCOUNTER — HOSPITAL ENCOUNTER (OUTPATIENT)
Dept: GENERAL RADIOLOGY | Facility: HOSPITAL | Age: 46
Discharge: HOME | End: 2021-10-11

## 2021-10-11 ENCOUNTER — OFFICE VISIT (OUTPATIENT)
Dept: FAMILY MEDICINE CLINIC | Facility: CLINIC | Age: 46
End: 2021-10-11

## 2021-10-11 VITALS
HEART RATE: 99 BPM | HEIGHT: 69 IN | OXYGEN SATURATION: 99 % | DIASTOLIC BLOOD PRESSURE: 97 MMHG | BODY MASS INDEX: 42.09 KG/M2 | SYSTOLIC BLOOD PRESSURE: 172 MMHG | TEMPERATURE: 98.2 F | WEIGHT: 284.2 LBS

## 2021-10-11 DIAGNOSIS — M79.642 BILATERAL HAND PAIN: ICD-10-CM

## 2021-10-11 DIAGNOSIS — Z23 NEED FOR INFLUENZA VACCINATION: Primary | ICD-10-CM

## 2021-10-11 DIAGNOSIS — H01.001 BLEPHARITIS OF RIGHT UPPER EYELID, UNSPECIFIED TYPE: ICD-10-CM

## 2021-10-11 DIAGNOSIS — Z11.59 NEED FOR HEPATITIS C SCREENING TEST: ICD-10-CM

## 2021-10-11 DIAGNOSIS — Z76.0 ENCOUNTER FOR MEDICATION REFILL: ICD-10-CM

## 2021-10-11 DIAGNOSIS — M79.641 BILATERAL HAND PAIN: ICD-10-CM

## 2021-10-11 DIAGNOSIS — I10 PRIMARY HYPERTENSION: ICD-10-CM

## 2021-10-11 DIAGNOSIS — J31.0 CHRONIC RHINITIS: ICD-10-CM

## 2021-10-11 PROCEDURE — 90471 IMMUNIZATION ADMIN: CPT | Performed by: PHYSICIAN ASSISTANT

## 2021-10-11 PROCEDURE — 73130 X-RAY EXAM OF HAND: CPT

## 2021-10-11 PROCEDURE — 90686 IIV4 VACC NO PRSV 0.5 ML IM: CPT | Performed by: PHYSICIAN ASSISTANT

## 2021-10-11 PROCEDURE — 99214 OFFICE O/P EST MOD 30 MIN: CPT | Performed by: PHYSICIAN ASSISTANT

## 2021-10-11 RX ORDER — HYDROCHLOROTHIAZIDE 25 MG/1
25 TABLET ORAL DAILY
Qty: 90 TABLET | Refills: 0 | Status: SHIPPED | OUTPATIENT
Start: 2021-10-11 | End: 2022-01-03

## 2021-10-11 RX ORDER — MONTELUKAST SODIUM 10 MG/1
10 TABLET ORAL NIGHTLY
Qty: 90 TABLET | Refills: 1 | Status: SHIPPED | OUTPATIENT
Start: 2021-10-11 | End: 2021-11-11

## 2021-10-11 RX ORDER — LISINOPRIL 40 MG/1
40 TABLET ORAL DAILY
Qty: 90 TABLET | Refills: 1 | Status: SHIPPED | OUTPATIENT
Start: 2021-10-11 | End: 2022-04-04

## 2021-10-11 NOTE — PROGRESS NOTES
"Chief Complaint  Chief Complaint   Patient presents with   • Hypertension     Patient is taking Lisinopril for this and states that she does check her blood pressure at home. patient states that she does get migraines no chest pain or LLE.    • Allergic Rhinitis     Claratin.    • Eye Problem     Patient states that she has a stye on her eye. Patient states that this is the left eye.        Subjective          Angelita Morgan presents to [facility]  History of Present Illness   HTN: Patient presents for hypertension management. Patient is currently taking Lisinopril 40mg daily and has been out of medication for about 1 week. Patient denies chest pain, shortness of air; she does have mild edema. Patient does check blood pressure at home; unsure of readings. Amlodipine was added at last visit but pateint stopped due to it making her \"feel weird\".    Allergic Rhinitis: Patient presents for management of Allergic Rhinitis. Patient is currently on Claritin. Symptoms are not well controlled. Patient states cough and postnasal drip for about 2 years.    Bilateral hand pain: discussed at last visit; pain started at work; tried voltaren gel without relief of symptoms; patient also tried Ibuprofen and tylenol and cbd oil without improvement of pain. 7/10 on pain scale. Area between fingers and palm hurts worse.    Left eye stye since Friday; mild blurry vision; patient states discomfort.    Labs; 3/29/2021    Medical History: has a past medical history of Anxiety, Arthritis, Diabetes ([facility]), Essential hypertension (04/26/2021), Fibromyalgia, Heart murmur, and Migraine.   Surgical History: has a past surgical history that includes Vaginal delivery.   Family History: family history includes ADD / ADHD in an other family member; Alzheimer's disease in an other family member; Depression in an other family member; Diabetes in an other family member; Lung cancer in an other family member; Ovarian " "cancer in an other family member; Stroke in an other family member.   Social History: reports that she quit smoking about 4 years ago. She started smoking about 27 years ago. She smoked 0.25 packs per day. She has never used smokeless tobacco. She reports that she does not drink alcohol and does not use drugs.    Allergies: Clindamycin hcl, Latex, and Penicillins    Health Maintenance Due   Topic Date Due   • ANNUAL PHYSICAL  Never done   • COVID-19 Vaccine (1) Never done   • TDAP/TD VACCINES (1 - Tdap) Never done   • HEPATITIS C SCREENING  Never done   • PAP SMEAR  Never done       Immunization History   Administered Date(s) Administered   • Flu Vaccine Quad PF >18YRS 02/01/2021   • FluLaval/Fluarix/Fluzone >6 10/04/2017, 10/11/2021   • Influenza TIV (IM) 10/04/2017   • Influenza, Unspecified 02/01/2021       Objective     Vitals:    10/11/21 0907 10/11/21 0936   BP:  172/97   Pulse: 99    Temp: 98.2 °F (36.8 °C)    SpO2: 99%    Weight: 129 kg (284 lb 3.2 oz)    Height: 175.3 cm (69\")      Body mass index is 41.97 kg/m².       Physical Exam  Vitals and nursing note reviewed.   Constitutional:       Appearance: Normal appearance. She is obese.   HENT:      Head: Normocephalic and atraumatic.   Eyes:      General:         Left eye: Hordeolum present.     Comments: Left lower lid with mild redness and inflamation   Neck:      Vascular: No carotid bruit.      Comments: Thyroid : gland size normal, nontender, no nodules or masses present on palpation   Cardiovascular:      Rate and Rhythm: Normal rate and regular rhythm.      Pulses: Normal pulses.      Heart sounds: Normal heart sounds.   Pulmonary:      Effort: Pulmonary effort is normal.      Breath sounds: Normal breath sounds.   Musculoskeletal:      Cervical back: Neck supple. No tenderness.      Right lower leg: No edema.      Left lower leg: No edema.   Lymphadenopathy:      Cervical: No cervical adenopathy.   Neurological:      Mental Status: She is alert. "   Psychiatric:         Mood and Affect: Mood normal.         Behavior: Behavior normal.           Result Review :                           Assessment and Plan      Diagnoses and all orders for this visit:    1. Need for influenza vaccination (Primary)  -     FluLaval/Fluarix >6 Months ([id number])    2. Primary hypertension  Assessment & Plan:  Worsening as patient off blood pressure medication; restart Lisinopril 40mg daily; add HCTZ 25mg daily; check labs; discussed compliance and follow up in 1mths. Discussed dietary changes and weight loss as well.    Orders:  -     lisinopril (PRINIVIL,ZESTRIL) 40 MG tablet; Take 1 tablet by mouth Daily.  Dispense: 90 tablet; Refill: 1  -     hydroCHLOROthiazide (HYDRODIURIL) 25 MG tablet; Take 1 tablet by mouth Daily.  Dispense: 90 tablet; Refill: 0  -     Comprehensive Metabolic Panel; Future  -     Lipid Panel; Future  -     CBC & Differential; Future  -     TSH; Future    3. Chronic rhinitis  Assessment & Plan:  Worsening; add Singulair 10mg daily to current regimen.    Orders:  -     montelukast (Singulair) 10 MG tablet; Take 1 tablet by mouth Every Night.  Dispense: 90 tablet; Refill: 1    4. Bilateral hand pain  Comments:  Xray bilateral hands; check labs  Orders:  -     XR Hand 3+ View Left; Future  -     XR Hand 3+ View Right; Future  -     Uric acid; Future  -     Antistreptolysin O screen; Future  -     Rheumatoid factor; Future  -     C-reactive protein; Future  -     ENOCH; Future  -     Sedimentation rate, automated; Future    5. Need for hepatitis C screening test  -     Hepatitis C Antibody; Future    6. Encounter for medication refill    7. Blepharitis of right upper eyelid, unspecified type  Comments:  Warm compression and massage with Yohan's baby shampoo.          Follow Up     Return in about 4 weeks (around 11/8/2021).    Patient was given instructions and counseling regarding her condition or for health maintenance advice. Please see specific information  pulled into the AVS if appropriate.

## 2021-10-13 NOTE — ASSESSMENT & PLAN NOTE
Worsening as patient off blood pressure medication; restart Lisinopril 40mg daily; add HCTZ 25mg daily; check labs; discussed compliance and follow up in 1mths. Discussed dietary changes and weight loss as well.

## 2021-10-14 ENCOUNTER — TELEPHONE (OUTPATIENT)
Dept: FAMILY MEDICINE CLINIC | Facility: CLINIC | Age: 46
End: 2021-10-14

## 2021-10-14 NOTE — TELEPHONE ENCOUNTER
----- Message from BRUCE Fraser sent at 10/13/2021  5:54 PM EDT -----  Please notify patient of normal results: labs order to check for autoimmune disorder.

## 2021-11-11 ENCOUNTER — OFFICE VISIT (OUTPATIENT)
Dept: FAMILY MEDICINE CLINIC | Facility: CLINIC | Age: 46
End: 2021-11-11

## 2021-11-11 ENCOUNTER — LAB (OUTPATIENT)
Dept: LAB | Facility: HOSPITAL | Age: 46
End: 2021-11-11

## 2021-11-11 VITALS
SYSTOLIC BLOOD PRESSURE: 132 MMHG | HEIGHT: 69 IN | WEIGHT: 277.6 LBS | OXYGEN SATURATION: 98 % | DIASTOLIC BLOOD PRESSURE: 80 MMHG | TEMPERATURE: 97.8 F | BODY MASS INDEX: 41.12 KG/M2 | HEART RATE: 83 BPM

## 2021-11-11 DIAGNOSIS — M79.642 BILATERAL HAND PAIN: ICD-10-CM

## 2021-11-11 DIAGNOSIS — I10 PRIMARY HYPERTENSION: Primary | ICD-10-CM

## 2021-11-11 DIAGNOSIS — I10 PRIMARY HYPERTENSION: ICD-10-CM

## 2021-11-11 DIAGNOSIS — M79.641 BILATERAL HAND PAIN: ICD-10-CM

## 2021-11-11 DIAGNOSIS — Z11.59 NEED FOR HEPATITIS C SCREENING TEST: ICD-10-CM

## 2021-11-11 LAB
ALBUMIN SERPL-MCNC: 4.6 G/DL (ref 3.5–5.2)
ALBUMIN/GLOB SERPL: 1.6 G/DL
ALP SERPL-CCNC: 55 U/L (ref 39–117)
ALT SERPL W P-5'-P-CCNC: 16 U/L (ref 1–33)
ANION GAP SERPL CALCULATED.3IONS-SCNC: 13.3 MMOL/L (ref 5–15)
AST SERPL-CCNC: 18 U/L (ref 1–32)
BASOPHILS # BLD AUTO: 0.04 10*3/MM3 (ref 0–0.2)
BASOPHILS NFR BLD AUTO: 0.8 % (ref 0–1.5)
BILIRUB SERPL-MCNC: 0.4 MG/DL (ref 0–1.2)
BUN SERPL-MCNC: 14 MG/DL (ref 6–20)
BUN/CREAT SERPL: 13.3 (ref 7–25)
CALCIUM SPEC-SCNC: 9.7 MG/DL (ref 8.6–10.5)
CHLORIDE SERPL-SCNC: 100 MMOL/L (ref 98–107)
CHOLEST SERPL-MCNC: 197 MG/DL (ref 0–200)
CHROMATIN AB SERPL-ACNC: <10 IU/ML (ref 0–14)
CO2 SERPL-SCNC: 23.7 MMOL/L (ref 22–29)
CREAT SERPL-MCNC: 1.05 MG/DL (ref 0.57–1)
CRP SERPL-MCNC: <0.3 MG/DL (ref 0–0.5)
DEPRECATED RDW RBC AUTO: 37.9 FL (ref 37–54)
EOSINOPHIL # BLD AUTO: 0.15 10*3/MM3 (ref 0–0.4)
EOSINOPHIL NFR BLD AUTO: 2.9 % (ref 0.3–6.2)
ERYTHROCYTE [DISTWIDTH] IN BLOOD BY AUTOMATED COUNT: 12.4 % (ref 12.3–15.4)
ERYTHROCYTE [SEDIMENTATION RATE] IN BLOOD: 4 MM/HR (ref 0–20)
GFR SERPL CREATININE-BSD FRML MDRD: 57 ML/MIN/1.73
GLOBULIN UR ELPH-MCNC: 2.8 GM/DL
GLUCOSE SERPL-MCNC: 100 MG/DL (ref 65–99)
HCT VFR BLD AUTO: 38.4 % (ref 34–46.6)
HCV AB SER DONR QL: NORMAL
HDLC SERPL-MCNC: 61 MG/DL (ref 40–60)
HGB BLD-MCNC: 13 G/DL (ref 12–15.9)
IMM GRANULOCYTES # BLD AUTO: 0.01 10*3/MM3 (ref 0–0.05)
IMM GRANULOCYTES NFR BLD AUTO: 0.2 % (ref 0–0.5)
LDLC SERPL CALC-MCNC: 117 MG/DL (ref 0–100)
LDLC/HDLC SERPL: 1.88 {RATIO}
LYMPHOCYTES # BLD AUTO: 1.74 10*3/MM3 (ref 0.7–3.1)
LYMPHOCYTES NFR BLD AUTO: 33.6 % (ref 19.6–45.3)
MCH RBC QN AUTO: 28.6 PG (ref 26.6–33)
MCHC RBC AUTO-ENTMCNC: 33.9 G/DL (ref 31.5–35.7)
MCV RBC AUTO: 84.6 FL (ref 79–97)
MONOCYTES # BLD AUTO: 0.39 10*3/MM3 (ref 0.1–0.9)
MONOCYTES NFR BLD AUTO: 7.5 % (ref 5–12)
NEUTROPHILS NFR BLD AUTO: 2.85 10*3/MM3 (ref 1.7–7)
NEUTROPHILS NFR BLD AUTO: 55 % (ref 42.7–76)
NRBC BLD AUTO-RTO: 0 /100 WBC (ref 0–0.2)
PLATELET # BLD AUTO: 407 10*3/MM3 (ref 140–450)
PMV BLD AUTO: 9.8 FL (ref 6–12)
POTASSIUM SERPL-SCNC: 3.5 MMOL/L (ref 3.5–5.2)
PROT SERPL-MCNC: 7.4 G/DL (ref 6–8.5)
RBC # BLD AUTO: 4.54 10*6/MM3 (ref 3.77–5.28)
SODIUM SERPL-SCNC: 137 MMOL/L (ref 136–145)
TRIGL SERPL-MCNC: 106 MG/DL (ref 0–150)
TSH SERPL DL<=0.05 MIU/L-ACNC: 2.03 UIU/ML (ref 0.27–4.2)
URATE SERPL-MCNC: 6.3 MG/DL (ref 2.4–5.7)
VLDLC SERPL-MCNC: 19 MG/DL (ref 5–40)
WBC # BLD AUTO: 5.18 10*3/MM3 (ref 3.4–10.8)

## 2021-11-11 PROCEDURE — 99213 OFFICE O/P EST LOW 20 MIN: CPT | Performed by: PHYSICIAN ASSISTANT

## 2021-11-11 PROCEDURE — 86060 ANTISTREPTOLYSIN O TITER: CPT

## 2021-11-11 PROCEDURE — 86803 HEPATITIS C AB TEST: CPT

## 2021-11-11 PROCEDURE — 36415 COLL VENOUS BLD VENIPUNCTURE: CPT

## 2021-11-11 PROCEDURE — 86140 C-REACTIVE PROTEIN: CPT

## 2021-11-11 PROCEDURE — 85652 RBC SED RATE AUTOMATED: CPT

## 2021-11-11 PROCEDURE — 86038 ANTINUCLEAR ANTIBODIES: CPT

## 2021-11-11 PROCEDURE — 86431 RHEUMATOID FACTOR QUANT: CPT

## 2021-11-11 PROCEDURE — 80050 GENERAL HEALTH PANEL: CPT

## 2021-11-11 PROCEDURE — 86063 ANTISTREPTOLYSIN O SCREEN: CPT

## 2021-11-11 PROCEDURE — 86225 DNA ANTIBODY NATIVE: CPT

## 2021-11-11 PROCEDURE — 80061 LIPID PANEL: CPT

## 2021-11-11 PROCEDURE — 84550 ASSAY OF BLOOD/URIC ACID: CPT

## 2021-11-11 RX ORDER — AMLODIPINE BESYLATE 5 MG/1
TABLET ORAL
COMMUNITY
Start: 2021-10-11 | End: 2021-11-11

## 2021-11-11 NOTE — ASSESSMENT & PLAN NOTE
Stable on Lisinopril 40mg daily and HCTZ 25mg daily; patient to check labs today; follow up in 5mths.

## 2021-11-11 NOTE — PROGRESS NOTES
Chief Complaint  Chief Complaint   Patient presents with   • Hypertension     one month follow up       Subjective          Angelita Morgan presents to [facility]  History of Present Illness     HTN: Patient is here for a one month follow up on her blood pressure. She has been tracking and the rangeis 106//74. States that lower extremity edema improved with HCTZ 25mg daily.    She had a reaction to her singular and has stopped taking. She would be sad and crying one minute and happy the next.     She declined the covid vaccines and is unsure when her last pap was.     Medical History: has a past medical history of Anxiety, Arthritis, Diabetes (HCC), Essential hypertension (04/26/2021), Fibromyalgia, Heart murmur, and Migraine.   Surgical History: has a past surgical history that includes Vaginal delivery.   Family History: family history includes ADD / ADHD in an other family member; Alzheimer's disease in an other family member; Depression in an other family member; Diabetes in an other family member; Lung cancer in an other family member; Ovarian cancer in an other family member; Stroke in an other family member.   Social History: reports that she quit smoking about 4 years ago. She started smoking about 27 years ago. She smoked 0.25 packs per day. She has never used smokeless tobacco. She reports that she does not drink alcohol and does not use drugs.    Allergies: Clindamycin hcl, Latex, and Penicillins    Health Maintenance Due   Topic Date Due   • ANNUAL PHYSICAL  Never done   • HEPATITIS C SCREENING  Never done   • PAP SMEAR  Never done       Immunization History   Administered Date(s) Administered   • Flu Vaccine Quad PF >18YRS 02/01/2021   • FluLaval/Fluarix/Fluzone >6 10/04/2017, 10/11/2021   • Influenza TIV (IM) 10/04/2017   • Influenza, Unspecified 02/01/2021   • Tdap 07/11/2021       Objective     Vitals:    11/11/21 0835   BP: 132/80   BP Location: Right arm  "  Patient Position: Sitting   Cuff Size: Adult   Pulse: 83   Temp: 97.8 °F (36.6 °C)   TempSrc: Temporal   SpO2: 98%   Weight: 126 kg (277 lb 9.6 oz)   Height: 175.3 cm (69\")     Body mass index is 40.99 kg/m².       Physical Exam  Vitals and nursing note reviewed.   Constitutional:       Appearance: Normal appearance. She is obese.   HENT:      Head: Normocephalic and atraumatic.   Neck:      Vascular: No carotid bruit.      Comments: Thyroid : gland size normal, nontender, no nodules or masses present on palpation   Cardiovascular:      Rate and Rhythm: Normal rate and regular rhythm.      Pulses: Normal pulses.      Heart sounds: Murmur heard.       Pulmonary:      Effort: Pulmonary effort is normal.      Breath sounds: Normal breath sounds.   Musculoskeletal:      Cervical back: Neck supple. No tenderness.      Right lower leg: No edema.      Left lower leg: No edema.   Lymphadenopathy:      Cervical: No cervical adenopathy.   Neurological:      Mental Status: She is alert.   Psychiatric:         Mood and Affect: Mood normal.         Behavior: Behavior normal.           Result Review :                           Assessment and Plan      Diagnoses and all orders for this visit:    1. Primary hypertension (Primary)  Assessment & Plan:  Stable on Lisinopril 40mg daily and HCTZ 25mg daily; patient to check labs today; follow up in 5mths.            Follow Up     Return in about 5 months (around 4/11/2022).    Patient was given instructions and counseling regarding her condition or for health maintenance advice. Please see specific information pulled into the AVS if appropriate.        "

## 2021-11-12 LAB
ASO AB SERPL-ACNC: POSITIVE [IU]/ML
DSDNA IGG SERPL IA-ACNC: NEGATIVE [IU]/ML
NUCLEAR IGG SER IA-RTO: NEGATIVE

## 2021-11-13 LAB — ASO AB SERPL-ACNC: 260.4 IU/ML (ref 0–200)

## 2021-11-17 ENCOUNTER — TELEPHONE (OUTPATIENT)
Dept: FAMILY MEDICINE CLINIC | Facility: CLINIC | Age: 46
End: 2021-11-17

## 2021-11-17 NOTE — TELEPHONE ENCOUNTER
----- Message from BRUCE Fraser sent at 11/14/2021  9:20 PM EST -----  Please notify patient of normal results except for the following:  ----LDL minimally elevated; decrease fats and fried foods in diet and increase exercise.  --uric acid in minimally elevated; will continue to monitor.

## 2022-01-02 DIAGNOSIS — I10 PRIMARY HYPERTENSION: ICD-10-CM

## 2022-01-03 RX ORDER — HYDROCHLOROTHIAZIDE 25 MG/1
TABLET ORAL
Qty: 90 TABLET | Refills: 0 | Status: SHIPPED | OUTPATIENT
Start: 2022-01-03 | End: 2022-04-05

## 2022-04-01 DIAGNOSIS — I10 PRIMARY HYPERTENSION: ICD-10-CM

## 2022-04-04 RX ORDER — LISINOPRIL 40 MG/1
TABLET ORAL
Qty: 90 TABLET | Refills: 0 | Status: SHIPPED | OUTPATIENT
Start: 2022-04-04 | End: 2022-04-08 | Stop reason: SDUPTHER

## 2022-04-05 DIAGNOSIS — I10 PRIMARY HYPERTENSION: ICD-10-CM

## 2022-04-05 RX ORDER — HYDROCHLOROTHIAZIDE 25 MG/1
TABLET ORAL
Qty: 90 TABLET | Refills: 0 | Status: SHIPPED | OUTPATIENT
Start: 2022-04-05 | End: 2022-04-08 | Stop reason: SDUPTHER

## 2022-04-06 NOTE — TELEPHONE ENCOUNTER
Left message advising Pt of results (hipaa)    Complex Repair And Transposition Flap Text: The defect edges were debeveled with a #15 scalpel blade.  The primary defect was closed partially with a complex linear closure.  Given the location of the remaining defect, shape of the defect and the proximity to free margins a transposition flap was deemed most appropriate for complete closure of the defect.  Using a sterile surgical marker, an appropriate advancement flap was drawn incorporating the defect and placing the expected incisions within the relaxed skin tension lines where possible.    The area thus outlined was incised deep to adipose tissue with a #15 scalpel blade.  The skin margins were undermined to an appropriate distance in all directions utilizing iris scissors.

## 2022-04-08 ENCOUNTER — OFFICE VISIT (OUTPATIENT)
Dept: FAMILY MEDICINE CLINIC | Facility: CLINIC | Age: 47
End: 2022-04-08

## 2022-04-08 VITALS
OXYGEN SATURATION: 98 % | DIASTOLIC BLOOD PRESSURE: 85 MMHG | WEIGHT: 273.4 LBS | SYSTOLIC BLOOD PRESSURE: 133 MMHG | BODY MASS INDEX: 40.49 KG/M2 | HEIGHT: 69 IN | HEART RATE: 103 BPM | TEMPERATURE: 98 F

## 2022-04-08 DIAGNOSIS — E66.01 CLASS 3 SEVERE OBESITY DUE TO EXCESS CALORIES WITH SERIOUS COMORBIDITY AND BODY MASS INDEX (BMI) OF 40.0 TO 44.9 IN ADULT: ICD-10-CM

## 2022-04-08 DIAGNOSIS — M79.7 FIBROMYALGIA: Primary | Chronic | ICD-10-CM

## 2022-04-08 DIAGNOSIS — I10 PRIMARY HYPERTENSION: Chronic | ICD-10-CM

## 2022-04-08 DIAGNOSIS — Z12.31 ENCOUNTER FOR SCREENING MAMMOGRAM FOR MALIGNANT NEOPLASM OF BREAST: ICD-10-CM

## 2022-04-08 PROBLEM — E66.813 CLASS 3 SEVERE OBESITY DUE TO EXCESS CALORIES WITH SERIOUS COMORBIDITY AND BODY MASS INDEX (BMI) OF 40.0 TO 44.9 IN ADULT: Status: ACTIVE | Noted: 2022-04-08

## 2022-04-08 PROCEDURE — 99214 OFFICE O/P EST MOD 30 MIN: CPT | Performed by: PHYSICIAN ASSISTANT

## 2022-04-08 RX ORDER — LISINOPRIL 40 MG/1
40 TABLET ORAL DAILY
Qty: 90 TABLET | Refills: 0 | Status: SHIPPED | OUTPATIENT
Start: 2022-04-08 | End: 2022-09-30 | Stop reason: SDUPTHER

## 2022-04-08 RX ORDER — AMITRIPTYLINE HYDROCHLORIDE 10 MG/1
10 TABLET, FILM COATED ORAL NIGHTLY
Qty: 90 TABLET | Refills: 0 | Status: SHIPPED | OUTPATIENT
Start: 2022-04-08 | End: 2022-07-07

## 2022-04-08 RX ORDER — HYDROCHLOROTHIAZIDE 25 MG/1
25 TABLET ORAL DAILY
Qty: 90 TABLET | Refills: 0 | Status: SHIPPED | OUTPATIENT
Start: 2022-04-08 | End: 2022-09-30 | Stop reason: SDUPTHER

## 2022-04-08 NOTE — ASSESSMENT & PLAN NOTE
Patient's (Body mass index is 40.37 kg/m².) indicates that they are morbidly obese (BMI > 40 or > 35 with obesity - related health condition) with health conditions that include hypertension . Weight is improving with lifestyle modifications. BMI is is above average; BMI management plan is completed. We discussed portion control and increasing exercise.

## 2022-04-08 NOTE — PROGRESS NOTES
Chief Complaint  Chief Complaint   Patient presents with   • Hypertension   • Med Refill       Subjective          Angelita Morgan presents to [facility]  History of Present Illness     HTN: Patient is here for a one month follow up on her blood pressure. She has not been checking her blood pressure at home.     The patient did test positive for depression today, she states she was on medication a long time ago but does not want to go on any medication.   Patient screened positive for depression based on a PHQ-9 score of 12 on 4/8/2022. Patient states that she doesn't feel depressed just in chronic pain.    The patient has a pain scale of 8 today, she states she hurts all over from her fibromyalgia. Uses CBD oil, Tylenol and Ibuprofen. Has been on other medications but didn't do well with side effects; unsure if she wants to try anything else.    She needs a refill on her HCTZ and Lisinopril looks like this was filled on 4/4/22 but she states her bottles say no refill.     She states the claritin helps with her allergies, she does take this medication daily.     LABS 11/11/21  Covid not vaccinated  Flu 10/11/21  Mammogram never  Pap years ago  Colonoscopy 3/23/2016       Medical History: has a past medical history of Anxiety, Arthritis, Diabetes (HCC), Essential hypertension (04/26/2021), Fibromyalgia, Heart murmur, and Migraine.   Surgical History: has a past surgical history that includes Vaginal delivery.   Family History: family history includes ADD / ADHD in an other family member; Alzheimer's disease in an other family member; Depression in an other family member; Diabetes in an other family member; Lung cancer in an other family member; Ovarian cancer in an other family member; Stroke in an other family member.   Social History: reports that she quit smoking about 5 years ago. She started smoking about 28 years ago. She smoked 0.25 packs per day. She has never used smokeless  "tobacco. She reports that she does not drink alcohol and does not use drugs.    Allergies: Clindamycin hcl, Latex, and Penicillins    Health Maintenance Due   Topic Date Due   • ANNUAL PHYSICAL  Never done   • COVID-19 Vaccine (1) Never done   • PAP SMEAR  06/11/2021       Immunization History   Administered Date(s) Administered   • FluLaval/Fluarix/Fluzone >6 10/04/2017, 10/11/2021   • Fluzone Split Quad (Multi-dose) 02/01/2021   • Influenza TIV (IM) 10/04/2017   • Influenza, Unspecified 02/01/2021   • Tdap 07/11/2021       Objective     Vitals:    04/08/22 0825   BP: 133/85   BP Location: Right arm   Patient Position: Sitting   Cuff Size: Large Adult   Pulse: 103   Temp: 98 °F (36.7 °C)   SpO2: 98%   Weight: 124 kg (273 lb 6.4 oz)   Height: 175.3 cm (69\")     Body mass index is 40.37 kg/m².       Physical Exam  Vitals and nursing note reviewed.   Constitutional:       Appearance: Normal appearance. She is obese.   HENT:      Head: Normocephalic and atraumatic.   Neck:      Vascular: No carotid bruit.      Comments: Thyroid : gland size normal, nontender, no nodules or masses present on palpation   Cardiovascular:      Rate and Rhythm: Normal rate and regular rhythm.      Pulses: Normal pulses.      Heart sounds: Normal heart sounds.   Pulmonary:      Effort: Pulmonary effort is normal.      Breath sounds: Normal breath sounds.   Musculoskeletal:      Cervical back: Neck supple. No tenderness.      Right lower leg: No edema.      Left lower leg: No edema.   Lymphadenopathy:      Cervical: No cervical adenopathy.   Neurological:      Mental Status: She is alert.   Psychiatric:         Mood and Affect: Mood normal.         Behavior: Behavior normal.           Result Review :                           Assessment and Plan      Diagnoses and all orders for this visit:    1. Fibromyalgia (Primary)  Comments:  Not stable; currently on no medications.Trial of Elavil 10mg at night. Administration and side effects discussed. " Will discuss progress at pap appt in May.  Orders:  -     amitriptyline (ELAVIL) 10 MG tablet; Take 1 tablet by mouth Every Night.  Dispense: 90 tablet; Refill: 0    2. Primary hypertension  Comments:  Stable on Lisinopril 40mg daily and HCTZ 25mg daily; check labs and follow up in 6mths.  Orders:  -     hydroCHLOROthiazide (HYDRODIURIL) 25 MG tablet; Take 1 tablet by mouth Daily.  Dispense: 90 tablet; Refill: 0  -     lisinopril (PRINIVIL,ZESTRIL) 40 MG tablet; Take 1 tablet by mouth Daily.  Dispense: 90 tablet; Refill: 0  -     Comprehensive Metabolic Panel; Future  -     Lipid Panel; Future  -     CBC & Differential; Future  -     TSH; Future  -     Urinalysis With Culture If Indicated -; Future    3. Encounter for screening mammogram for malignant neoplasm of breast  -     Mammo Screening Digital Tomosynthesis Bilateral With CAD; Future    4. Class 3 severe obesity due to excess calories with serious comorbidity and body mass index (BMI) of 40.0 to 44.9 in adult (HCC)  Assessment & Plan:  Patient's (Body mass index is 40.37 kg/m².) indicates that they are morbidly obese (BMI > 40 or > 35 with obesity - related health condition) with health conditions that include hypertension . Weight is improving with lifestyle modifications. BMI is is above average; BMI management plan is completed. We discussed portion control and increasing exercise.             Follow Up     Return in about 6 months (around 10/8/2022).    Patient was given instructions and counseling regarding her condition or for health maintenance advice. Please see specific information pulled into the AVS if appropriate.

## 2022-04-19 ENCOUNTER — TELEPHONE (OUTPATIENT)
Dept: FAMILY MEDICINE CLINIC | Facility: CLINIC | Age: 47
End: 2022-04-19

## 2022-05-13 ENCOUNTER — OFFICE VISIT (OUTPATIENT)
Dept: FAMILY MEDICINE CLINIC | Facility: CLINIC | Age: 47
End: 2022-05-13

## 2022-05-13 ENCOUNTER — LAB (OUTPATIENT)
Dept: LAB | Facility: HOSPITAL | Age: 47
End: 2022-05-13

## 2022-05-13 VITALS
WEIGHT: 275.8 LBS | SYSTOLIC BLOOD PRESSURE: 125 MMHG | OXYGEN SATURATION: 100 % | HEART RATE: 78 BPM | DIASTOLIC BLOOD PRESSURE: 82 MMHG | HEIGHT: 69 IN | BODY MASS INDEX: 40.85 KG/M2

## 2022-05-13 DIAGNOSIS — I10 PRIMARY HYPERTENSION: ICD-10-CM

## 2022-05-13 DIAGNOSIS — M79.7 FIBROMYALGIA: ICD-10-CM

## 2022-05-13 DIAGNOSIS — Z00.00 ANNUAL PHYSICAL EXAM: ICD-10-CM

## 2022-05-13 DIAGNOSIS — Z01.419 WOMEN'S ANNUAL ROUTINE GYNECOLOGICAL EXAMINATION: Primary | ICD-10-CM

## 2022-05-13 LAB
ALBUMIN SERPL-MCNC: 4.6 G/DL (ref 3.5–5.2)
ALBUMIN/GLOB SERPL: 1.8 G/DL
ALP SERPL-CCNC: 57 U/L (ref 39–117)
ALT SERPL W P-5'-P-CCNC: 11 U/L (ref 1–33)
ANION GAP SERPL CALCULATED.3IONS-SCNC: 15.5 MMOL/L (ref 5–15)
AST SERPL-CCNC: 15 U/L (ref 1–32)
BASOPHILS # BLD AUTO: 0.05 10*3/MM3 (ref 0–0.2)
BASOPHILS NFR BLD AUTO: 1.2 % (ref 0–1.5)
BILIRUB BLD-MCNC: NEGATIVE MG/DL
BILIRUB SERPL-MCNC: 0.3 MG/DL (ref 0–1.2)
BILIRUB UR QL STRIP: NEGATIVE
BUN SERPL-MCNC: 25 MG/DL (ref 6–20)
BUN/CREAT SERPL: 19.8 (ref 7–25)
CALCIUM SPEC-SCNC: 9.2 MG/DL (ref 8.6–10.5)
CHLORIDE SERPL-SCNC: 102 MMOL/L (ref 98–107)
CHOLEST SERPL-MCNC: 192 MG/DL (ref 0–200)
CLARITY UR: CLEAR
CLARITY, POC: CLEAR
CO2 SERPL-SCNC: 20.5 MMOL/L (ref 22–29)
COLOR UR: YELLOW
COLOR UR: YELLOW
CREAT SERPL-MCNC: 1.26 MG/DL (ref 0.57–1)
DEPRECATED RDW RBC AUTO: 36.2 FL (ref 37–54)
EGFRCR SERPLBLD CKD-EPI 2021: 53.8 ML/MIN/1.73
EOSINOPHIL # BLD AUTO: 0.22 10*3/MM3 (ref 0–0.4)
EOSINOPHIL NFR BLD AUTO: 5.2 % (ref 0.3–6.2)
ERYTHROCYTE [DISTWIDTH] IN BLOOD BY AUTOMATED COUNT: 12 % (ref 12.3–15.4)
EXPIRATION DATE: ABNORMAL
GLOBULIN UR ELPH-MCNC: 2.6 GM/DL
GLUCOSE SERPL-MCNC: 88 MG/DL (ref 65–99)
GLUCOSE UR STRIP-MCNC: NEGATIVE MG/DL
GLUCOSE UR STRIP-MCNC: NEGATIVE MG/DL
HCT VFR BLD AUTO: 36.3 % (ref 34–46.6)
HDLC SERPL-MCNC: 58 MG/DL (ref 40–60)
HGB BLD-MCNC: 12.2 G/DL (ref 12–15.9)
HGB UR QL STRIP.AUTO: NEGATIVE
IMM GRANULOCYTES # BLD AUTO: 0.01 10*3/MM3 (ref 0–0.05)
IMM GRANULOCYTES NFR BLD AUTO: 0.2 % (ref 0–0.5)
KETONES UR QL STRIP: NEGATIVE
KETONES UR QL: NEGATIVE
LDLC SERPL CALC-MCNC: 124 MG/DL (ref 0–100)
LDLC/HDLC SERPL: 2.12 {RATIO}
LEUKOCYTE EST, POC: NEGATIVE
LEUKOCYTE ESTERASE UR QL STRIP.AUTO: NEGATIVE
LYMPHOCYTES # BLD AUTO: 1.7 10*3/MM3 (ref 0.7–3.1)
LYMPHOCYTES NFR BLD AUTO: 39.9 % (ref 19.6–45.3)
Lab: ABNORMAL
MCH RBC QN AUTO: 28.2 PG (ref 26.6–33)
MCHC RBC AUTO-ENTMCNC: 33.6 G/DL (ref 31.5–35.7)
MCV RBC AUTO: 84 FL (ref 79–97)
MONOCYTES # BLD AUTO: 0.38 10*3/MM3 (ref 0.1–0.9)
MONOCYTES NFR BLD AUTO: 8.9 % (ref 5–12)
NEUTROPHILS NFR BLD AUTO: 1.9 10*3/MM3 (ref 1.7–7)
NEUTROPHILS NFR BLD AUTO: 44.6 % (ref 42.7–76)
NITRITE UR QL STRIP: NEGATIVE
NITRITE UR-MCNC: NEGATIVE MG/ML
NRBC BLD AUTO-RTO: 0 /100 WBC (ref 0–0.2)
PH UR STRIP.AUTO: 6 [PH] (ref 5–8)
PH UR: 5.5 [PH] (ref 5–8)
PLATELET # BLD AUTO: 340 10*3/MM3 (ref 140–450)
PMV BLD AUTO: 9.9 FL (ref 6–12)
POTASSIUM SERPL-SCNC: 3.9 MMOL/L (ref 3.5–5.2)
PROT SERPL-MCNC: 7.2 G/DL (ref 6–8.5)
PROT UR QL STRIP: NEGATIVE
PROT UR STRIP-MCNC: NEGATIVE MG/DL
RBC # BLD AUTO: 4.32 10*6/MM3 (ref 3.77–5.28)
RBC # UR STRIP: ABNORMAL /UL
SODIUM SERPL-SCNC: 138 MMOL/L (ref 136–145)
SP GR UR STRIP: 1.01 (ref 1–1.03)
SP GR UR: 1.01 (ref 1–1.03)
TRIGL SERPL-MCNC: 55 MG/DL (ref 0–150)
TSH SERPL DL<=0.05 MIU/L-ACNC: 2.88 UIU/ML (ref 0.27–4.2)
UROBILINOGEN UR QL STRIP: NORMAL
UROBILINOGEN UR QL: NORMAL
VLDLC SERPL-MCNC: 10 MG/DL (ref 5–40)
WBC NRBC COR # BLD: 4.26 10*3/MM3 (ref 3.4–10.8)

## 2022-05-13 PROCEDURE — 99396 PREV VISIT EST AGE 40-64: CPT | Performed by: PHYSICIAN ASSISTANT

## 2022-05-13 PROCEDURE — 87624 HPV HI-RISK TYP POOLED RSLT: CPT | Performed by: PHYSICIAN ASSISTANT

## 2022-05-13 PROCEDURE — G0123 SCREEN CERV/VAG THIN LAYER: HCPCS | Performed by: PHYSICIAN ASSISTANT

## 2022-05-13 PROCEDURE — 80061 LIPID PANEL: CPT

## 2022-05-13 PROCEDURE — 81003 URINALYSIS AUTO W/O SCOPE: CPT | Performed by: PHYSICIAN ASSISTANT

## 2022-05-13 PROCEDURE — 81003 URINALYSIS AUTO W/O SCOPE: CPT

## 2022-05-13 PROCEDURE — 36415 COLL VENOUS BLD VENIPUNCTURE: CPT

## 2022-05-13 PROCEDURE — 80050 GENERAL HEALTH PANEL: CPT

## 2022-05-13 NOTE — PROGRESS NOTES
"Chief Complaint  Chief Complaint   Patient presents with   • Annual Exam   • Gynecologic Exam       Subjective          Angelita Morgan presents to [facility]  History of Present Illness     Patient presents today for a physical with PAP. Patient states that she believes she started her period today, but it is very light presently.     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    LABS: 5/13/22  COVID: REFUSED  FLU: VACCINATED  PAP: 1/21/16  COLON: 3/23/2016  MAMMO: ordered    Medical History: has a past medical history of Anxiety, Arthritis, Diabetes (HCC), Essential hypertension (04/26/2021), Fibromyalgia, Heart murmur, and Migraine.   Surgical History: has a past surgical history that includes Vaginal delivery.   Family History: family history includes ADD / ADHD in an other family member; Alzheimer's disease in an other family member; Depression in an other family member; Diabetes in an other family member; Lung cancer in an other family member; Ovarian cancer in an other family member; Stroke in an other family member.   Social History: reports that she quit smoking about 5 years ago. She started smoking about 28 years ago. She has a 3.75 pack-year smoking history. She has never used smokeless tobacco. She reports that she does not drink alcohol and does not use drugs.    Allergies: Clindamycin hcl, Latex, and Penicillins    Health Maintenance Due   Topic Date Due   • ANNUAL PHYSICAL  Never done   • PAP SMEAR  06/11/2021       Immunization History   Administered Date(s) Administered   • FluLaval/Fluarix/Fluzone >6 10/04/2017, 10/11/2021   • Fluzone Split Quad (Multi-dose) 02/01/2021   • Influenza TIV (IM) 10/04/2017   • Influenza, Unspecified 02/01/2021   • Tdap 07/11/2021       Objective     Vitals:    05/13/22 0911   BP: 125/82   Pulse: 78   SpO2: 100%   Weight: 125 kg (275 lb 12.8 oz)   Height: 175.3 cm (69\")     Body mass index is 40.73 kg/m².     Wt " Readings from Last 3 Encounters:   05/13/22 125 kg (275 lb 12.8 oz)   04/08/22 124 kg (273 lb 6.4 oz)   11/11/21 126 kg (277 lb 9.6 oz)     BP Readings from Last 3 Encounters:   05/13/22 125/82   04/08/22 133/85   11/11/21 132/80       Class 3 Severe Obesity (BMI >=40). Obesity-related health conditions include the following: hypertension and osteoarthritis. Obesity is improving with lifestyle modifications. BMI is is above average; BMI management plan is completed. We discussed portion control and increasing exercise.      Patient Care Team:  Pat Krishna PA as PCP - General (Family Medicine)    Physical Exam  Vitals and nursing note reviewed.   Constitutional:       Appearance: Normal appearance. She is obese.   HENT:      Head: Normocephalic and atraumatic.   Neck:      Vascular: No carotid bruit.      Comments: Thyroid : gland size normal, nontender, no nodules or masses present on palpation   Cardiovascular:      Rate and Rhythm: Normal rate and regular rhythm.      Pulses: Normal pulses.      Heart sounds: Normal heart sounds.   Pulmonary:      Effort: Pulmonary effort is normal.      Breath sounds: Normal breath sounds.   Chest:   Breasts:      Right: Normal. No axillary adenopathy or supraclavicular adenopathy.      Left: Normal. No axillary adenopathy or supraclavicular adenopathy.       Genitourinary:     General: Normal vulva.      Vagina: Normal.      Cervix: Cervical bleeding present.      Uterus: Normal.       Adnexa: Right adnexa normal and left adnexa normal.   Musculoskeletal:      Cervical back: Neck supple. No tenderness.      Right lower leg: No edema.      Left lower leg: No edema.   Lymphadenopathy:      Cervical: No cervical adenopathy.      Upper Body:      Right upper body: No supraclavicular or axillary adenopathy.      Left upper body: No supraclavicular or axillary adenopathy.   Neurological:      Mental Status: She is alert.   Psychiatric:         Mood and Affect: Mood normal.          Behavior: Behavior normal.           Result Review :                           Assessment and Plan      Diagnoses and all orders for this visit:    1. Women's annual routine gynecological examination (Primary)  -     POCT urinalysis dipstick, automated  -     IgP, Aptima HPV; Future  -     IgP, Aptima HPV    2. Annual physical exam    3. Fibromyalgia  Comments:  Improved with Elavil 10mg but weight increased a bit; will continue with 10mg for now and continue to monitor weight.    Pertinent health maintenance and preventative counseling discussed including immunizations, diet, exercise, sleep and labs.  The patient is advised to begin progressive daily aerobic exercise program, attempt to lose weight, improve dietary compliance, continue current medications and return for routine annual checkups.          Follow Up     Return for Next scheduled follow up.    Patient was given instructions and counseling regarding her condition or for health maintenance advice. Please see specific information pulled into the AVS if appropriate.

## 2022-05-15 DIAGNOSIS — N28.9 ABNORMAL RENAL FUNCTION: Primary | ICD-10-CM

## 2022-05-17 ENCOUNTER — TELEPHONE (OUTPATIENT)
Dept: FAMILY MEDICINE CLINIC | Facility: CLINIC | Age: 47
End: 2022-05-17

## 2022-05-17 NOTE — TELEPHONE ENCOUNTER
Left message on VM for patient to call back for lab results:   From Pat Krishna: Normal results except for the following:  --renal function is a bit decreased; let's stay off all NSAIDs ( you can use Tylenol but no IBF, Motrin, Aleve) and ensure adequate hydration; recheck in 1mth (already ordered).  ----LDL minimally elevated; decrease fats and fried foods in diet and increase exercise.    HUB OK TO READ

## 2022-05-17 NOTE — TELEPHONE ENCOUNTER
----- Message from Nicole Dunham sent at 5/16/2022  3:37 PM EDT -----    ----- Message -----  From: Pat Krishna PA  Sent: 5/15/2022  10:46 PM EDT  To: [facility] [place] [facility]    Normal results except for the following:  --renal function is a bit decreased; let's stay off all NSAIDs ( you can use Tylenol but no IBF, Motrin, Aleve) and ensure adequate hydration; recheck in 1mth (already ordered).  ----LDL minimally elevated; decrease fats and fried foods in diet and increase exercise.

## 2022-05-20 LAB
CYTOLOGIST CVX/VAG CYTO: NORMAL
CYTOLOGY CVX/VAG DOC CYTO: NORMAL
CYTOLOGY CVX/VAG DOC THIN PREP: NORMAL
DX ICD CODE: NORMAL
HIV 1 & 2 AB SER-IMP: NORMAL
HPV I/H RISK 4 DNA CVX QL PROBE+SIG AMP: NEGATIVE
OTHER STN SPEC: NORMAL
STAT OF ADQ CVX/VAG CYTO-IMP: NORMAL

## 2022-05-24 ENCOUNTER — TELEPHONE (OUTPATIENT)
Dept: FAMILY MEDICINE CLINIC | Facility: CLINIC | Age: 47
End: 2022-05-24

## 2022-05-24 NOTE — TELEPHONE ENCOUNTER
----- Message from BRUCE Fraser sent at 5/21/2022  3:42 PM EDT -----  Normal pap; HPV negative--follow up pap due in 3 years.

## 2022-06-21 ENCOUNTER — TELEPHONE (OUTPATIENT)
Dept: FAMILY MEDICINE CLINIC | Facility: CLINIC | Age: 47
End: 2022-06-21

## 2022-06-24 ENCOUNTER — APPOINTMENT (OUTPATIENT)
Dept: MAMMOGRAPHY | Facility: HOSPITAL | Age: 47
End: 2022-06-24

## 2022-07-01 ENCOUNTER — APPOINTMENT (OUTPATIENT)
Dept: MAMMOGRAPHY | Facility: HOSPITAL | Age: 47
End: 2022-07-01

## 2022-07-04 DIAGNOSIS — M79.7 FIBROMYALGIA: Chronic | ICD-10-CM

## 2022-07-06 ENCOUNTER — TELEPHONE (OUTPATIENT)
Dept: FAMILY MEDICINE CLINIC | Facility: CLINIC | Age: 47
End: 2022-07-06

## 2022-07-06 NOTE — TELEPHONE ENCOUNTER
Called patient and LVM for her to call back about short term disability paperwork that we received.

## 2022-07-07 ENCOUNTER — TELEPHONE (OUTPATIENT)
Dept: FAMILY MEDICINE CLINIC | Facility: CLINIC | Age: 47
End: 2022-07-07

## 2022-07-07 DIAGNOSIS — M79.7 FIBROMYALGIA: Chronic | ICD-10-CM

## 2022-07-07 RX ORDER — AMITRIPTYLINE HYDROCHLORIDE 10 MG/1
TABLET, FILM COATED ORAL
Qty: 90 TABLET | Refills: 0 | Status: SHIPPED | OUTPATIENT
Start: 2022-07-07 | End: 2022-09-30 | Stop reason: SDUPTHER

## 2022-07-07 RX ORDER — AMITRIPTYLINE HYDROCHLORIDE 10 MG/1
10 TABLET, FILM COATED ORAL
Qty: 90 TABLET | Refills: 0 | Status: CANCELLED | OUTPATIENT
Start: 2022-07-07

## 2022-07-07 NOTE — TELEPHONE ENCOUNTER
Caller: Angelita Morgan    Relationship: Self    Best call back number: [phone]    Requested Prescriptions:   Requested Prescriptions     Pending Prescriptions Disp Refills   • amitriptyline (ELAVIL) 10 MG tablet 90 tablet 0     Sig: Take 1 tablet by mouth every night at bedtime.      Pharmacy where request should be sent: [facility]/PHARMACY #[id number] - [place], [place] - [address] [facility] [phone] [facility] [phone] FX     Does the patient have less than a 3 day supply:  [x] Yes  [] No    Philip Hair Rep   07/07/22 16:08 EDT

## 2022-07-07 NOTE — TELEPHONE ENCOUNTER
Caller: Angelita Morgan    Relationship: Self    Best call back number: [phone]    Who are you requesting to speak with (clinical staff, provider,  specific staff member): MICHAEL    What was the call regarding: PATIENT MISSED A CALL FROM THE OFFICE REGARDING PAPERWORK. DUE TO IT BEING AFTER HOURS, HUB DID NOT ATTEMPT TO WARM TRANSFER. PLEASE CALL PATIENT WHEN AVAILABLE.

## 2022-07-08 ENCOUNTER — TELEPHONE (OUTPATIENT)
Dept: FAMILY MEDICINE CLINIC | Facility: CLINIC | Age: 47
End: 2022-07-08

## 2022-07-08 RX ORDER — AMITRIPTYLINE HYDROCHLORIDE 10 MG/1
10 TABLET, FILM COATED ORAL
Qty: 90 TABLET | Refills: 0 | OUTPATIENT
Start: 2022-07-08

## 2022-09-29 DIAGNOSIS — I10 PRIMARY HYPERTENSION: Chronic | ICD-10-CM

## 2022-09-29 RX ORDER — LISINOPRIL 40 MG/1
TABLET ORAL
Qty: 90 TABLET | Refills: 0 | OUTPATIENT
Start: 2022-09-29

## 2022-09-29 RX ORDER — HYDROCHLOROTHIAZIDE 25 MG/1
TABLET ORAL
Qty: 90 TABLET | Refills: 0 | OUTPATIENT
Start: 2022-09-29

## 2022-09-29 NOTE — PROGRESS NOTES
Chief Complaint  Chief Complaint   Patient presents with   • Fibromyalgia   • Hypertension       Subjective          Angelita Morgan presents to [facility]  History of Present Illness     HTN: Patient presents for hypertension management. Patient is currently taking Lisinopril and HCTZ and is consistent with medication. Patient denies chest pain, shortness of air and edema. Patient does not check blood pressure at home. Patient has been working on weight reduction with HIIT exercise and monitoring diet and has lost about 9-10 pounds since last visit; blood pressure has improved.    Fibromyalgia: The patient currently takes Elavil and Uses CBD oil, Tylenol and Ibuprofen.  Fibromyalgia symptoms are improved with weight reduction.    MAMMO: DUE- Was scheduled for 7/1/22, cancelled  COLON: 3/23/16    Medical History: has a past medical history of Anxiety, Arthritis, Diabetes ([facility]), Essential hypertension (04/26/2021), Fibromyalgia, Heart murmur, and Migraine.   Surgical History: has a past surgical history that includes Vaginal delivery.   Family History: family history includes ADD / ADHD in an other family member; Alzheimer's disease in an other family member; Depression in an other family member; Diabetes in an other family member; Lung cancer in an other family member; Ovarian cancer in an other family member; Stroke in an other family member.   Social History: reports that she quit smoking about 5 years ago. She started smoking about 28 years ago. She has a 3.75 pack-year smoking history. She has never used smokeless tobacco. She reports that she does not drink alcohol and does not use drugs.    Allergies: Clindamycin hcl, Latex, and Penicillins    There are no preventive care reminders to display for this patient.    Immunization History   Administered Date(s) Administered   • FluLaval/Fluzone >6mos 10/04/2017, 10/11/2021, 09/30/2022   • Fluzone Quad >6mos (Multi-dose) 02/01/2021   •  "Influenza TIV (IM) 10/04/2017   • Influenza, Unspecified 02/01/2021   • Tdap 07/11/2021       Objective     Vitals:    09/30/22 0814   BP: 129/65   Pulse: 84   SpO2: 99%   Weight: 118 kg (261 lb 3.2 oz)   Height: 175.3 cm (69\")     Body mass index is 38.57 kg/m².     Wt Readings from Last 3 Encounters:   09/30/22 118 kg (261 lb 3.2 oz)   05/13/22 125 kg (275 lb 12.8 oz)   04/08/22 124 kg (273 lb 6.4 oz)     BP Readings from Last 3 Encounters:   09/30/22 129/65   05/13/22 125/82   04/08/22 133/85       Class 3 Severe Obesity (BMI >=40). Obesity-related health conditions include the following: hypertension. Obesity is improving with lifestyle modifications. BMI is is above average; BMI management plan is completed. We discussed portion control and increasing exercise.      Patient Care Team:  Pat Krishna PA as PCP - General (Family Medicine)    Physical Exam  Vitals and nursing note reviewed.   Constitutional:       Appearance: Normal appearance. She is obese.   HENT:      Head: Normocephalic and atraumatic.   Neck:      Vascular: No carotid bruit.      Comments: Thyroid : gland size normal, nontender, no nodules or masses present on palpation   Cardiovascular:      Rate and Rhythm: Normal rate and regular rhythm.      Pulses: Normal pulses.      Heart sounds: Murmur heard.   Pulmonary:      Effort: Pulmonary effort is normal.      Breath sounds: Normal breath sounds.   Musculoskeletal:      Cervical back: Neck supple. No tenderness.      Right lower leg: No edema.      Left lower leg: No edema.   Lymphadenopathy:      Cervical: No cervical adenopathy.   Neurological:      Mental Status: She is alert.   Psychiatric:         Mood and Affect: Mood normal.         Behavior: Behavior normal.           Result Review :                           Assessment and Plan      Diagnoses and all orders for this visit:    1. Need for influenza vaccination (Primary)  -     FluLaval/Fluzone >6 mos ([id number])    2. " Fibromyalgia  Comments:  Improved on Elavil 10mg at night and weight reduction.  Continue current regimen and follow-up in 6 months  Orders:  -     amitriptyline (ELAVIL) 10 MG tablet; Take 1 tablet by mouth every night at bedtime.  Dispense: 90 tablet; Refill: 1    3. Primary hypertension  Comments:  Stable on Lisinopril 40mg daily and HCTZ 25mg daily; check labs and follow up in 6mths.  Orders:  -     Comprehensive Metabolic Panel; Future  -     Lipid Panel; Future  -     CBC & Differential; Future  -     TSH; Future  -     hydroCHLOROthiazide (HYDRODIURIL) 25 MG tablet; Take 1 tablet by mouth Daily.  Dispense: 90 tablet; Refill: 1  -     lisinopril (PRINIVIL,ZESTRIL) 40 MG tablet; Take 1 tablet by mouth Daily.  Dispense: 90 tablet; Refill: 1  -     TSH  -     CBC & Differential  -     Lipid Panel  -     Comprehensive Metabolic Panel    4. Class 3 severe obesity due to excess calories with serious comorbidity and body mass index (BMI) of 40.0 to 44.9 in adult (HCC)  Comments:  Improved with exercise and improve nutrition; continue current regimen.    5. Abnormal renal function  -     Comprehensive metabolic panel            Follow Up     Return in about 6 months (around 3/30/2023).    Patient was given instructions and counseling regarding her condition or for health maintenance advice. Please see specific information pulled into the AVS if appropriate.

## 2022-09-30 ENCOUNTER — OFFICE VISIT (OUTPATIENT)
Dept: FAMILY MEDICINE CLINIC | Facility: CLINIC | Age: 47
End: 2022-09-30

## 2022-09-30 VITALS
HEIGHT: 69 IN | OXYGEN SATURATION: 99 % | DIASTOLIC BLOOD PRESSURE: 65 MMHG | BODY MASS INDEX: 38.69 KG/M2 | HEART RATE: 84 BPM | WEIGHT: 261.2 LBS | SYSTOLIC BLOOD PRESSURE: 129 MMHG

## 2022-09-30 DIAGNOSIS — N28.9 ABNORMAL RENAL FUNCTION: ICD-10-CM

## 2022-09-30 DIAGNOSIS — M79.7 FIBROMYALGIA: Chronic | ICD-10-CM

## 2022-09-30 DIAGNOSIS — I10 PRIMARY HYPERTENSION: Chronic | ICD-10-CM

## 2022-09-30 DIAGNOSIS — Z23 NEED FOR INFLUENZA VACCINATION: Primary | ICD-10-CM

## 2022-09-30 DIAGNOSIS — E66.01 CLASS 3 SEVERE OBESITY DUE TO EXCESS CALORIES WITH SERIOUS COMORBIDITY AND BODY MASS INDEX (BMI) OF 40.0 TO 44.9 IN ADULT: Chronic | ICD-10-CM

## 2022-09-30 PROCEDURE — 99214 OFFICE O/P EST MOD 30 MIN: CPT | Performed by: PHYSICIAN ASSISTANT

## 2022-09-30 PROCEDURE — 90471 IMMUNIZATION ADMIN: CPT | Performed by: PHYSICIAN ASSISTANT

## 2022-09-30 PROCEDURE — 90686 IIV4 VACC NO PRSV 0.5 ML IM: CPT | Performed by: PHYSICIAN ASSISTANT

## 2022-09-30 RX ORDER — LISINOPRIL 40 MG/1
40 TABLET ORAL DAILY
Qty: 90 TABLET | Refills: 1 | Status: SHIPPED | OUTPATIENT
Start: 2022-09-30 | End: 2023-03-24 | Stop reason: SDUPTHER

## 2022-09-30 RX ORDER — AMITRIPTYLINE HYDROCHLORIDE 10 MG/1
10 TABLET, FILM COATED ORAL
Qty: 90 TABLET | Refills: 1 | Status: SHIPPED | OUTPATIENT
Start: 2022-09-30 | End: 2023-03-24

## 2022-09-30 RX ORDER — HYDROCHLOROTHIAZIDE 25 MG/1
25 TABLET ORAL DAILY
Qty: 90 TABLET | Refills: 1 | Status: SHIPPED | OUTPATIENT
Start: 2022-09-30 | End: 2023-03-24 | Stop reason: SDUPTHER

## 2022-10-05 ENCOUNTER — TELEPHONE (OUTPATIENT)
Dept: FAMILY MEDICINE CLINIC | Facility: CLINIC | Age: 47
End: 2022-10-05

## 2022-10-31 ENCOUNTER — TELEPHONE (OUTPATIENT)
Dept: FAMILY MEDICINE CLINIC | Facility: CLINIC | Age: 47
End: 2022-10-31

## 2022-10-31 NOTE — TELEPHONE ENCOUNTER
Called emergency contact back. Left detailed message.  Per FDA update online it was two lot #s affected. Will need to contact pharmacy for additonal information to obtain the lot # and see what recommended.

## 2022-10-31 NOTE — TELEPHONE ENCOUNTER
Caller: KAMI LANIER    Relationship: Emergency Contact    Best call back number: [phone]    Which medication are you concerned about:   hydroCHLOROthiazide (HYDRODIURIL) 25 MG tablet    Who prescribed you this medication: DELMA MCLEOD     What are your concerns: PATIENT IS CONCERNED ABOUT hydroCHLOROthiazide (HYDRODIURIL) 25 MG tablet DUE TO A RECENT RECALL AND WOULD LIKE TO SPEAK WITH PCP BEFORE TAKING MORE MEDICATION.

## 2023-03-24 ENCOUNTER — OFFICE VISIT (OUTPATIENT)
Dept: FAMILY MEDICINE CLINIC | Facility: CLINIC | Age: 48
End: 2023-03-24
Payer: COMMERCIAL

## 2023-03-24 VITALS
HEART RATE: 82 BPM | BODY MASS INDEX: 39.72 KG/M2 | RESPIRATION RATE: 16 BRPM | SYSTOLIC BLOOD PRESSURE: 125 MMHG | WEIGHT: 268.2 LBS | DIASTOLIC BLOOD PRESSURE: 81 MMHG | HEIGHT: 69 IN | OXYGEN SATURATION: 97 %

## 2023-03-24 DIAGNOSIS — I10 PRIMARY HYPERTENSION: Primary | Chronic | ICD-10-CM

## 2023-03-24 DIAGNOSIS — E66.01 CLASS 2 SEVERE OBESITY DUE TO EXCESS CALORIES WITH SERIOUS COMORBIDITY AND BODY MASS INDEX (BMI) OF 39.0 TO 39.9 IN ADULT: ICD-10-CM

## 2023-03-24 RX ORDER — HYDROCHLOROTHIAZIDE 25 MG/1
25 TABLET ORAL DAILY
Qty: 90 TABLET | Refills: 1 | Status: SHIPPED | OUTPATIENT
Start: 2023-03-24

## 2023-03-24 RX ORDER — LISINOPRIL 40 MG/1
40 TABLET ORAL DAILY
Qty: 90 TABLET | Refills: 1 | Status: SHIPPED | OUTPATIENT
Start: 2023-03-24

## 2023-03-24 NOTE — PROGRESS NOTES
Chief Complaint  Chief Complaint   Patient presents with   • Follow-up     6 month   • Fibromyalgia       Subjective          Angelita Morgan presents to [facility] for 6 month follow up.    History of Present Illness    HTN: Patient presents for hypertension management. Patient is currently taking HCTZ 25MG and Lisinopril 40MG and is consistent with medication. Patient denies chest pain, shortness of air and edema. Patient does not check blood pressure at home.    Fibromyalgia: Pt is currently prescribed Amitriptyline 10MG for Fibromyalgia. Pt states she stopped taking it because it made her feel sick. Patient is doing well without.    Colon: 3.23.16    Medical History: has a past medical history of Anxiety, Arthritis, Diabetes (HCC), Essential hypertension (04/26/2021), Fibromyalgia, Heart murmur, and Migraine.   Surgical History: has a past surgical history that includes Vaginal delivery.   Family History: family history includes ADD / ADHD in an other family member; Alzheimer's disease in an other family member; Depression in an other family member; Diabetes in an other family member; Lung cancer in an other family member; Ovarian cancer in an other family member; Stroke in an other family member.   Social History: reports that she quit smoking about 6 years ago. Her smoking use included cigarettes. She started smoking about 29 years ago. She has a 3.75 pack-year smoking history. She has never used smokeless tobacco. She reports that she does not drink alcohol and does not use drugs.    Allergies: Clindamycin hcl, Latex, and Penicillins    Health Maintenance Due   Topic Date Due   • COVID-19 Vaccine (1) Never done       Immunization History   Administered Date(s) Administered   • FluLaval/Fluzone >6mos 10/04/2017, 10/11/2021, 09/30/2022   • Fluzone Quad >6mos (Multi-dose) 02/01/2021   • Influenza TIV (IM) 10/04/2017   • Influenza, Unspecified 02/01/2021, 09/30/2022   • Tdap  "07/11/2021       Objective     Vitals:    03/24/23 0807   BP: 125/81   Pulse: 82   Resp: 16   SpO2: 97%   Weight: 122 kg (268 lb 3.2 oz)   Height: 175.3 cm (69\")     Body mass index is 39.61 kg/m².     Wt Readings from Last 3 Encounters:   03/24/23 122 kg (268 lb 3.2 oz)   09/30/22 118 kg (261 lb 3.2 oz)   05/13/22 125 kg (275 lb 12.8 oz)     BP Readings from Last 3 Encounters:   03/24/23 125/81   09/30/22 129/65   05/13/22 125/82       Class 2 Severe Obesity (BMI >=35 and <=39.9). Obesity-related health conditions include the following: hypertension. Obesity is improving with lifestyle modifications. BMI is is above average; BMI management plan is completed. We discussed portion control and increasing exercise.      Patient Care Team:  Pat Krishna PA as PCP - General (Family Medicine)    Physical Exam  Vitals and nursing note reviewed.   Constitutional:       Appearance: Normal appearance. She is obese.   HENT:      Head: Normocephalic and atraumatic.   Neck:      Vascular: No carotid bruit.      Comments: Thyroid : gland size normal, nontender, no nodules or masses present on palpation   Cardiovascular:      Rate and Rhythm: Normal rate and regular rhythm.      Pulses: Normal pulses.      Heart sounds: Murmur heard.   Pulmonary:      Effort: Pulmonary effort is normal.      Breath sounds: Normal breath sounds.   Musculoskeletal:      Cervical back: Neck supple. No tenderness.      Right lower leg: No edema.      Left lower leg: No edema.   Lymphadenopathy:      Cervical: No cervical adenopathy.   Neurological:      Mental Status: She is alert.   Psychiatric:         Mood and Affect: Mood normal.         Behavior: Behavior normal.           Result Review :                           Assessment and Plan      Diagnoses and all orders for this visit:    1. Primary hypertension (Primary)  Comments:  Stable on Lisinopril 40mg daily and HCTZ 25mg daily; check labs and follow up in 6mths.  Orders:  -     " hydroCHLOROthiazide (HYDRODIURIL) 25 MG tablet; Take 1 tablet by mouth Daily.  Dispense: 90 tablet; Refill: 1  -     lisinopril (PRINIVIL,ZESTRIL) 40 MG tablet; Take 1 tablet by mouth Daily.  Dispense: 90 tablet; Refill: 1    2. Class 2 severe obesity due to excess calories with serious comorbidity and body mass index (BMI) of 39.0 to 39.9 in adult (HCC)    Labs in order from 9/2022 still active but have not been completed yet therefore new labs were not ordered.        Follow Up     Return in about 6 months (around 9/24/2023).    Patient was given instructions and counseling regarding her condition or for health maintenance advice. Please see specific information pulled into the AVS if appropriate.

## 2023-03-25 DIAGNOSIS — M79.7 FIBROMYALGIA: Chronic | ICD-10-CM

## 2023-03-27 RX ORDER — AMITRIPTYLINE HYDROCHLORIDE 10 MG/1
TABLET, FILM COATED ORAL
Qty: 90 TABLET | Refills: 1 | OUTPATIENT
Start: 2023-03-27

## 2023-09-21 NOTE — PROGRESS NOTES
Chief Complaint  Chief Complaint   Patient presents with    Follow-up     6 month     Hypertension       Subjective          Angelita Morgan presents to [facility] for 6 month follow up.     History of Present Illness    HTN: Patient presents for hypertension management. Patient is currently taking Lisinopril and HCTZ and is consistent with medication. Patient denies chest pain, shortness of air and edema. Patient does not check blood pressure at home.    Generalized Pain: patient has been diagnosed with fibromyalgia; chronic pain for some time but worse in the past 3 mths. Raziatent has tried NSAIDs, CBD oil, Elavil (felt funny), Cymbalta (dizziness) and tylenol. Patient worked on weight loss and did well and felt better with less pain but has regained weight.    Patient c/o cough; denies drainage, has allergies, uses OTC agents without relief, Minimal shortness of breath, non productive. No fever, chills.    Patient is overdue on labs.  Gerardo gain of 14lbs since last ov.    Colon: 3.23.16  Mammo: due    Medical History: has a past medical history of Anxiety, Arthritis, Diabetes, Essential hypertension (04/26/2021), Fibromyalgia, Heart murmur, and Migraine.   Surgical History: has a past surgical history that includes Vaginal delivery.   Family History: family history includes ADD / ADHD in an other family member; Alzheimer's disease in an other family member; Depression in an other family member; Diabetes in an other family member; Lung cancer in an other family member; Ovarian cancer in an other family member; Stroke in an other family member.   Social History: reports that she quit smoking about 6 years ago. Her smoking use included cigarettes. She started smoking about 29 years ago. She has a 3.75 pack-year smoking history. She has never used smokeless tobacco. She reports that she does not drink alcohol and does not use drugs.    Allergies: Clindamycin hcl, Latex, and  "Penicillins    Health Maintenance Due   Topic Date Due    ANNUAL PHYSICAL  05/13/2023       Immunization History   Administered Date(s) Administered    Fluzone (or Fluarix & Flulaval for VFC) >6mos 10/04/2017, 10/11/2021, 09/30/2022    Fluzone Quad >6mos (Multi-dose) 02/01/2021    Influenza TIV (IM) 10/04/2017    Influenza, Unspecified 02/01/2021, 09/30/2022    Tdap 07/11/2021       Objective     Vitals:    09/22/23 0821 09/22/23 0846   BP: 141/87 138/72   BP Location: Right arm    Patient Position: Sitting    Cuff Size: Large Adult    Pulse: 70    Resp: 20    SpO2: 99%    Weight: 128 kg (282 lb 12.8 oz)    Height: 175.3 cm (69\")      Body mass index is 41.76 kg/m².     Wt Readings from Last 3 Encounters:   09/22/23 128 kg (282 lb 12.8 oz)   03/24/23 122 kg (268 lb 3.2 oz)   09/30/22 118 kg (261 lb 3.2 oz)     BP Readings from Last 3 Encounters:   09/22/23 138/72   03/24/23 125/81   09/30/22 129/65     Patient Care Team:  Pat Krishna PA as PCP - General (Family Medicine)    Physical Exam  Vitals and nursing note reviewed.   Constitutional:       Appearance: Normal appearance. She is obese.   HENT:      Head: Normocephalic and atraumatic.   Neck:      Vascular: No carotid bruit.      Comments: Thyroid : gland size normal, nontender, no nodules or masses present on palpation   Cardiovascular:      Rate and Rhythm: Normal rate and regular rhythm.      Pulses: Normal pulses.      Heart sounds: Normal heart sounds.   Pulmonary:      Effort: Pulmonary effort is normal.      Breath sounds: Normal breath sounds.   Musculoskeletal:      Cervical back: Neck supple. No tenderness.      Right lower leg: No edema.      Left lower leg: No edema.   Lymphadenopathy:      Cervical: No cervical adenopathy.   Neurological:      Mental Status: She is alert.   Psychiatric:         Mood and Affect: Mood normal.         Behavior: Behavior normal.         Result Review :                           Assessment and Plan  "     Diagnoses and all orders for this visit:    1. Fibromyalgia (Primary)  Comments:  Worsening: trial of Savella 12.5mg daily Admin and side effects discussed. Message or call in 1mth.  Orders:  -     Milnacipran HCl (Savella) 12.5 MG tablet; Take 1 tablet by mouth Daily.  Dispense: 90 tablet; Refill: 0    2. Primary hypertension  Comments:  Stable on Lisinopril 40mg daily and HCTZ 25mg daily; check labs and follow up in 6mths.  Orders:  -     hydroCHLOROthiazide (HYDRODIURIL) 25 MG tablet; Take 1 tablet by mouth Daily.  Dispense: 90 tablet; Refill: 1  -     lisinopril (PRINIVIL,ZESTRIL) 40 MG tablet; Take 1 tablet by mouth Daily.  Dispense: 90 tablet; Refill: 1  -     Comprehensive Metabolic Panel; Future  -     Lipid Panel; Future  -     CBC & Differential; Future  -     TSH; Future    3. Encounter for screening mammogram for malignant neoplasm of breast  -     Mammo Screening Digital Tomosynthesis Bilateral With CAD; Future    4. Subacute cough  Comments:  New problem: check CXR, trial of Albuterol inhaler to use every 4-6hrs as needed. F/u if no improvment.  Orders:  -     XR Chest PA & Lateral; Future  -     albuterol sulfate  (90 Base) MCG/ACT inhaler; Inhale 2 puffs Every 4 (Four) Hours As Needed for Wheezing.  Dispense: 18 g; Refill: 1    5. Seasonal allergies  Comments:  Not currently controlled; trial of Zyrtec 10mg daily.  Orders:  -     cetirizine (zyrTEC) 10 MG tablet; Take 1 tablet by mouth Daily.  Dispense: 90 tablet; Refill: 1            Follow Up     Return in about 6 months (around 3/22/2024).    Patient was given instructions and counseling regarding her condition or for health maintenance advice. Please see specific information pulled into the AVS if appropriate.

## 2023-09-22 ENCOUNTER — OFFICE VISIT (OUTPATIENT)
Dept: FAMILY MEDICINE CLINIC | Facility: CLINIC | Age: 48
End: 2023-09-22
Payer: COMMERCIAL

## 2023-09-22 ENCOUNTER — HOSPITAL ENCOUNTER (OUTPATIENT)
Dept: GENERAL RADIOLOGY | Facility: HOSPITAL | Age: 48
Discharge: HOME | End: 2023-09-22
Admitting: PHYSICIAN ASSISTANT
Payer: COMMERCIAL

## 2023-09-22 VITALS
RESPIRATION RATE: 20 BRPM | OXYGEN SATURATION: 99 % | HEART RATE: 70 BPM | WEIGHT: 282.8 LBS | BODY MASS INDEX: 41.89 KG/M2 | SYSTOLIC BLOOD PRESSURE: 138 MMHG | HEIGHT: 69 IN | DIASTOLIC BLOOD PRESSURE: 72 MMHG

## 2023-09-22 DIAGNOSIS — J30.2 SEASONAL ALLERGIES: Chronic | ICD-10-CM

## 2023-09-22 DIAGNOSIS — R05.2 SUBACUTE COUGH: ICD-10-CM

## 2023-09-22 DIAGNOSIS — I10 PRIMARY HYPERTENSION: Chronic | ICD-10-CM

## 2023-09-22 DIAGNOSIS — Z12.31 ENCOUNTER FOR SCREENING MAMMOGRAM FOR MALIGNANT NEOPLASM OF BREAST: ICD-10-CM

## 2023-09-22 DIAGNOSIS — M79.7 FIBROMYALGIA: Primary | Chronic | ICD-10-CM

## 2023-09-22 PROCEDURE — 71046 X-RAY EXAM CHEST 2 VIEWS: CPT

## 2023-09-22 PROCEDURE — 3078F DIAST BP <80 MM HG: CPT | Performed by: PHYSICIAN ASSISTANT

## 2023-09-22 PROCEDURE — 3075F SYST BP GE 130 - 139MM HG: CPT | Performed by: PHYSICIAN ASSISTANT

## 2023-09-22 PROCEDURE — 1160F RVW MEDS BY RX/DR IN RCRD: CPT | Performed by: PHYSICIAN ASSISTANT

## 2023-09-22 PROCEDURE — 99214 OFFICE O/P EST MOD 30 MIN: CPT | Performed by: PHYSICIAN ASSISTANT

## 2023-09-22 PROCEDURE — 1159F MED LIST DOCD IN RCRD: CPT | Performed by: PHYSICIAN ASSISTANT

## 2023-09-22 RX ORDER — HYDROCHLOROTHIAZIDE 25 MG/1
25 TABLET ORAL DAILY
Qty: 90 TABLET | Refills: 1 | Status: SHIPPED | OUTPATIENT
Start: 2023-09-22

## 2023-09-22 RX ORDER — ALBUTEROL SULFATE 90 UG/1
2 AEROSOL, METERED RESPIRATORY (INHALATION) EVERY 4 HOURS PRN
Qty: 18 G | Refills: 1 | Status: SHIPPED | OUTPATIENT
Start: 2023-09-22

## 2023-09-22 RX ORDER — LISINOPRIL 40 MG/1
40 TABLET ORAL DAILY
Qty: 90 TABLET | Refills: 1 | Status: SHIPPED | OUTPATIENT
Start: 2023-09-22

## 2023-09-22 RX ORDER — CETIRIZINE HYDROCHLORIDE 10 MG/1
10 TABLET ORAL DAILY
Qty: 90 TABLET | Refills: 1 | Status: SHIPPED | OUTPATIENT
Start: 2023-09-22

## 2023-09-23 ENCOUNTER — LAB (OUTPATIENT)
Dept: LAB | Facility: HOSPITAL | Age: 48
End: 2023-09-23
Payer: COMMERCIAL

## 2023-09-23 DIAGNOSIS — I10 PRIMARY HYPERTENSION: Chronic | ICD-10-CM

## 2023-09-23 LAB
ALBUMIN SERPL-MCNC: 4.9 G/DL (ref 3.5–5.2)
ALBUMIN/GLOB SERPL: 2 G/DL
ALP SERPL-CCNC: 72 U/L (ref 39–117)
ALT SERPL W P-5'-P-CCNC: 15 U/L (ref 1–33)
ANION GAP SERPL CALCULATED.3IONS-SCNC: 13 MMOL/L (ref 5–15)
AST SERPL-CCNC: 15 U/L (ref 1–32)
BASOPHILS # BLD AUTO: 0.05 10*3/MM3 (ref 0–0.2)
BASOPHILS NFR BLD AUTO: 0.9 % (ref 0–1.5)
BILIRUB SERPL-MCNC: 0.3 MG/DL (ref 0–1.2)
BUN SERPL-MCNC: 12 MG/DL (ref 6–20)
BUN/CREAT SERPL: 10.7 (ref 7–25)
CALCIUM SPEC-SCNC: 9.7 MG/DL (ref 8.6–10.5)
CHLORIDE SERPL-SCNC: 103 MMOL/L (ref 98–107)
CHOLEST SERPL-MCNC: 187 MG/DL (ref 0–200)
CO2 SERPL-SCNC: 26 MMOL/L (ref 22–29)
CREAT SERPL-MCNC: 1.12 MG/DL (ref 0.57–1)
DEPRECATED RDW RBC AUTO: 38.6 FL (ref 37–54)
EGFRCR SERPLBLD CKD-EPI 2021: 61.5 ML/MIN/1.73
EOSINOPHIL # BLD AUTO: 0.34 10*3/MM3 (ref 0–0.4)
EOSINOPHIL NFR BLD AUTO: 5.9 % (ref 0.3–6.2)
ERYTHROCYTE [DISTWIDTH] IN BLOOD BY AUTOMATED COUNT: 12.6 % (ref 12.3–15.4)
GLOBULIN UR ELPH-MCNC: 2.5 GM/DL
GLUCOSE SERPL-MCNC: 101 MG/DL (ref 65–99)
HCT VFR BLD AUTO: 39 % (ref 34–46.6)
HDLC SERPL-MCNC: 60 MG/DL (ref 40–60)
HGB BLD-MCNC: 12.9 G/DL (ref 12–15.9)
IMM GRANULOCYTES # BLD AUTO: 0.02 10*3/MM3 (ref 0–0.05)
IMM GRANULOCYTES NFR BLD AUTO: 0.3 % (ref 0–0.5)
LDLC SERPL CALC-MCNC: 98 MG/DL (ref 0–100)
LDLC/HDLC SERPL: 1.56 {RATIO}
LYMPHOCYTES # BLD AUTO: 1.99 10*3/MM3 (ref 0.7–3.1)
LYMPHOCYTES NFR BLD AUTO: 34.7 % (ref 19.6–45.3)
MCH RBC QN AUTO: 28 PG (ref 26.6–33)
MCHC RBC AUTO-ENTMCNC: 33.1 G/DL (ref 31.5–35.7)
MCV RBC AUTO: 84.8 FL (ref 79–97)
MONOCYTES # BLD AUTO: 0.44 10*3/MM3 (ref 0.1–0.9)
MONOCYTES NFR BLD AUTO: 7.7 % (ref 5–12)
NEUTROPHILS NFR BLD AUTO: 2.89 10*3/MM3 (ref 1.7–7)
NEUTROPHILS NFR BLD AUTO: 50.5 % (ref 42.7–76)
NRBC BLD AUTO-RTO: 0 /100 WBC (ref 0–0.2)
PLATELET # BLD AUTO: 374 10*3/MM3 (ref 140–450)
PMV BLD AUTO: 9.9 FL (ref 6–12)
POTASSIUM SERPL-SCNC: 3.9 MMOL/L (ref 3.5–5.2)
PROT SERPL-MCNC: 7.4 G/DL (ref 6–8.5)
RBC # BLD AUTO: 4.6 10*6/MM3 (ref 3.77–5.28)
SODIUM SERPL-SCNC: 142 MMOL/L (ref 136–145)
TRIGL SERPL-MCNC: 166 MG/DL (ref 0–150)
TSH SERPL DL<=0.05 MIU/L-ACNC: 1.77 UIU/ML (ref 0.27–4.2)
VLDLC SERPL-MCNC: 29 MG/DL (ref 5–40)
WBC NRBC COR # BLD: 5.73 10*3/MM3 (ref 3.4–10.8)

## 2023-09-23 PROCEDURE — 80061 LIPID PANEL: CPT | Performed by: PHYSICIAN ASSISTANT

## 2023-09-23 PROCEDURE — 80050 GENERAL HEALTH PANEL: CPT | Performed by: PHYSICIAN ASSISTANT

## 2023-09-25 ENCOUNTER — TELEPHONE (OUTPATIENT)
Dept: FAMILY MEDICINE CLINIC | Facility: CLINIC | Age: 48
End: 2023-09-25

## 2023-09-25 NOTE — TELEPHONE ENCOUNTER
HUB TO READ:    Left VM to return call. Please inform patient of lab results:   triglycerides (sugary, starchy part of your cholesterol) is elevated; decrease sugars and carbs in diet

## 2023-09-26 ENCOUNTER — TELEPHONE (OUTPATIENT)
Dept: FAMILY MEDICINE CLINIC | Facility: CLINIC | Age: 48
End: 2023-09-26
Payer: COMMERCIAL

## 2023-09-26 NOTE — TELEPHONE ENCOUNTER
Caller: KAMI LANIER    Relationship: Emergency Contact    Best call back number: [phone]     Who are you requesting to speak with (clinical staff, provider,  specific staff member): CLINICAL    What was the call regarding: PATIENT'S  STATES THAT THEY NEED A PRIOR AUTHORIZATION COMPLETED FOR THE PATIENT'S SAVELLA PRESCRIPTION.

## 2023-10-06 ENCOUNTER — TELEPHONE (OUTPATIENT)
Dept: FAMILY MEDICINE CLINIC | Facility: CLINIC | Age: 48
End: 2023-10-06
Payer: COMMERCIAL

## 2023-10-06 NOTE — TELEPHONE ENCOUNTER
It appears we have a mammogram referral in your chart that has not been scheduled. Please call [phone] option 3 to schedule this test.    Thank you.

## 2023-10-07 ENCOUNTER — PATIENT MESSAGE (OUTPATIENT)
Dept: FAMILY MEDICINE CLINIC | Facility: CLINIC | Age: 48
End: 2023-10-07
Payer: COMMERCIAL

## 2023-10-09 RX ORDER — IBUPROFEN 400 MG/1
400 TABLET ORAL 2 TIMES DAILY PRN
Qty: 60 TABLET | Refills: 1 | Status: SHIPPED | OUTPATIENT
Start: 2023-10-09

## 2023-10-09 NOTE — TELEPHONE ENCOUNTER
From: Angelita Morgan  To: Pat Krishna  Sent: 10/7/2023 1:09 PM EDT  Subject: Medication    Is there anyway that I can get Acetaminophen 500mg and Advil 200mg as a perscription please?

## 2023-11-11 ENCOUNTER — HOSPITAL ENCOUNTER (EMERGENCY)
Facility: HOSPITAL | Age: 48
Discharge: HOME | End: 2023-11-11
Attending: EMERGENCY MEDICINE

## 2023-11-11 VITALS
DIASTOLIC BLOOD PRESSURE: 91 MMHG | HEART RATE: 87 BPM | OXYGEN SATURATION: 99 % | SYSTOLIC BLOOD PRESSURE: 123 MMHG | BODY MASS INDEX: 41.87 KG/M2 | RESPIRATION RATE: 18 BRPM | HEIGHT: 69 IN | TEMPERATURE: 98.4 F

## 2023-11-11 DIAGNOSIS — M79.10 MYALGIA: Primary | ICD-10-CM

## 2023-11-11 DIAGNOSIS — R11.10 VOMITING, UNSPECIFIED VOMITING TYPE, UNSPECIFIED WHETHER NAUSEA PRESENT: ICD-10-CM

## 2023-11-11 LAB
ALBUMIN SERPL-MCNC: 4.3 G/DL (ref 3.5–5.2)
ALBUMIN/GLOB SERPL: 1.6 G/DL
ALP SERPL-CCNC: 69 U/L (ref 39–117)
ALT SERPL W P-5'-P-CCNC: 16 U/L (ref 1–33)
ANION GAP SERPL CALCULATED.3IONS-SCNC: 13.4 MMOL/L (ref 5–15)
AST SERPL-CCNC: 17 U/L (ref 1–32)
BASOPHILS # BLD AUTO: 0.08 10*3/MM3 (ref 0–0.2)
BASOPHILS NFR BLD AUTO: 0.9 % (ref 0–1.5)
BILIRUB SERPL-MCNC: <0.2 MG/DL (ref 0–1.2)
BUN SERPL-MCNC: 28 MG/DL (ref 6–20)
BUN/CREAT SERPL: 24.3 (ref 7–25)
CALCIUM SPEC-SCNC: 9.2 MG/DL (ref 8.6–10.5)
CHLORIDE SERPL-SCNC: 100 MMOL/L (ref 98–107)
CO2 SERPL-SCNC: 23.6 MMOL/L (ref 22–29)
CREAT SERPL-MCNC: 1.15 MG/DL (ref 0.57–1)
DEPRECATED RDW RBC AUTO: 38.5 FL (ref 37–54)
EGFRCR SERPLBLD CKD-EPI 2021: 59.6 ML/MIN/1.73
EOSINOPHIL # BLD AUTO: 1.17 10*3/MM3 (ref 0–0.4)
EOSINOPHIL NFR BLD AUTO: 13.6 % (ref 0.3–6.2)
ERYTHROCYTE [DISTWIDTH] IN BLOOD BY AUTOMATED COUNT: 12.6 % (ref 12.3–15.4)
FLUAV SUBTYP SPEC NAA+PROBE: NOT DETECTED
FLUBV RNA ISLT QL NAA+PROBE: NOT DETECTED
GLOBULIN UR ELPH-MCNC: 2.7 GM/DL
GLUCOSE SERPL-MCNC: 132 MG/DL (ref 65–99)
HCG INTACT+B SERPL-ACNC: <0.5 MIU/ML
HCT VFR BLD AUTO: 34.8 % (ref 34–46.6)
HGB BLD-MCNC: 11.7 G/DL (ref 12–15.9)
HOLD SPECIMEN: NORMAL
HOLD SPECIMEN: NORMAL
IMM GRANULOCYTES # BLD AUTO: 0.02 10*3/MM3 (ref 0–0.05)
IMM GRANULOCYTES NFR BLD AUTO: 0.2 % (ref 0–0.5)
LIPASE SERPL-CCNC: 41 U/L (ref 13–60)
LYMPHOCYTES # BLD AUTO: 3.25 10*3/MM3 (ref 0.7–3.1)
LYMPHOCYTES NFR BLD AUTO: 37.9 % (ref 19.6–45.3)
MCH RBC QN AUTO: 27.9 PG (ref 26.6–33)
MCHC RBC AUTO-ENTMCNC: 33.6 G/DL (ref 31.5–35.7)
MCV RBC AUTO: 83.1 FL (ref 79–97)
MONOCYTES # BLD AUTO: 0.66 10*3/MM3 (ref 0.1–0.9)
MONOCYTES NFR BLD AUTO: 7.7 % (ref 5–12)
NEUTROPHILS NFR BLD AUTO: 3.4 10*3/MM3 (ref 1.7–7)
NEUTROPHILS NFR BLD AUTO: 39.7 % (ref 42.7–76)
NRBC BLD AUTO-RTO: 0 /100 WBC (ref 0–0.2)
PLATELET # BLD AUTO: 365 10*3/MM3 (ref 140–450)
PMV BLD AUTO: 9.2 FL (ref 6–12)
POTASSIUM SERPL-SCNC: 3.2 MMOL/L (ref 3.5–5.2)
PROT SERPL-MCNC: 7 G/DL (ref 6–8.5)
QT INTERVAL: 383 MS
QTC INTERVAL: 474 MS
RBC # BLD AUTO: 4.19 10*6/MM3 (ref 3.77–5.28)
RSV RNA NPH QL NAA+NON-PROBE: NOT DETECTED
SARS-COV-2 RNA RESP QL NAA+PROBE: NOT DETECTED
SODIUM SERPL-SCNC: 137 MMOL/L (ref 136–145)
WBC NRBC COR # BLD: 8.58 10*3/MM3 (ref 3.4–10.8)
WHOLE BLOOD HOLD COAG: NORMAL
WHOLE BLOOD HOLD SPECIMEN: NORMAL

## 2023-11-11 PROCEDURE — 93005 ELECTROCARDIOGRAM TRACING: CPT | Performed by: EMERGENCY MEDICINE

## 2023-11-11 PROCEDURE — 83690 ASSAY OF LIPASE: CPT

## 2023-11-11 PROCEDURE — 99283 EMERGENCY DEPT VISIT LOW MDM: CPT

## 2023-11-11 PROCEDURE — 93005 ELECTROCARDIOGRAM TRACING: CPT

## 2023-11-11 PROCEDURE — 36415 COLL VENOUS BLD VENIPUNCTURE: CPT

## 2023-11-11 PROCEDURE — 87637 SARSCOV2&INF A&B&RSV AMP PRB: CPT | Performed by: EMERGENCY MEDICINE

## 2023-11-11 PROCEDURE — 85025 COMPLETE CBC W/AUTO DIFF WBC: CPT | Performed by: EMERGENCY MEDICINE

## 2023-11-11 PROCEDURE — 80053 COMPREHEN METABOLIC PANEL: CPT

## 2023-11-11 PROCEDURE — 93010 ELECTROCARDIOGRAM REPORT: CPT | Performed by: INTERNAL MEDICINE

## 2023-11-11 PROCEDURE — 84702 CHORIONIC GONADOTROPIN TEST: CPT

## 2023-11-11 RX ORDER — SODIUM CHLORIDE 0.9 % (FLUSH) 0.9 %
10 SYRINGE (ML) INJECTION AS NEEDED
Status: DISCONTINUED | OUTPATIENT
Start: 2023-11-11 | End: 2023-11-11 | Stop reason: HOSPADM

## 2023-11-11 RX ORDER — ONDANSETRON 4 MG/1
4 TABLET, ORALLY DISINTEGRATING ORAL 4 TIMES DAILY PRN
Qty: 9 TABLET | Refills: 0 | Status: SHIPPED | OUTPATIENT
Start: 2023-11-11

## 2023-11-11 NOTE — ED PROVIDER NOTES
Time: 3:22 AM EST  Date of encounter:  11/11/2023  Independent Historian/Clinical History and Information was obtained by:   Patient and Family    History is limited by: N/A    Chief Complaint: Vomiting      History of Present Illness:  Patient is a 48 y.o. year old female who presents to the emergency department for evaluation of vomiting    Patient complains of pain over her entire body.    Patient family bedside states patient woke with pain over her entire body.  She woke up her significant other who said that she was clinched up complaining of pain over her entire body.  She had 1 episode of vomiting.  He became concerned and called EMS.  The patient states she feels improved at this time.  She is not sure what happened.  She states she was in her normal state of health at bedtime yesterday.  She denies any known sick contacts.    HPI    Patient Care Team  Primary Care Provider: Pat Krishna PA    Past Medical History:     Allergies   Allergen Reactions    Clindamycin Hcl Unknown - Low Severity    Latex Unknown - Low Severity    Penicillins Unknown - Low Severity     Past Medical History:   Diagnosis Date    Anxiety     Arthritis     Diabetes     Essential hypertension 04/26/2021    Fibromyalgia     Heart murmur     Migraine      Past Surgical History:   Procedure Laterality Date    VAGINAL DELIVERY       Family History   Problem Relation Age of Onset    Depression Other     Stroke Other     Alzheimer's disease Other     Lung cancer Other     Ovarian cancer Other         family history    ADD / ADHD Other     Diabetes Other        Home Medications:  Prior to Admission medications    Medication Sig Start Date End Date Taking? Authorizing Provider   Acetaminophen 500 MG capsule Take 1 capsule by mouth Every 8 (Eight) Hours As Needed for Moderate Pain. 10/9/23   Pat Krishna PA   albuterol sulfate  (90 Base) MCG/ACT inhaler Inhale 2 puffs Every 4 (Four) Hours As Needed for Wheezing.  "23   Pat Krishna PA   CBD (cannabidiol) oral oil Take 1 drop by mouth Every 6 (Six) Hours.    Provider, MD E[place]   cetirizine (zyrTEC) 10 MG tablet Take 1 tablet by mouth Daily. 23   Pat Krishna PA   hydroCHLOROthiazide (HYDRODIURIL) 25 MG tablet Take 1 tablet by mouth Daily. 23   Pat Krishna PA   ibuprofen (ADVIL,MOTRIN) 400 MG tablet Take 1 tablet by mouth 2 (Two) Times a Day As Needed for Moderate Pain. 10/9/23   Pat Krishna PA   lisinopril (PRINIVIL,ZESTRIL) 40 MG tablet Take 1 tablet by mouth Daily. 23   Pat Krishna PA   Milnacipran HCl (Savella) 12.5 MG tablet Take 1 tablet by mouth Daily. 23   Pat Krishna PA        Social History:   Social History     Tobacco Use    Smoking status: Former     Packs/day: 0.25     Years: 15.00     Additional pack years: 0.00     Total pack years: 3.75     Types: Cigarettes     Start date:      Quit date: 2017     Years since quittin.8    Smokeless tobacco: Never   Vaping Use    Vaping Use: Never used   Substance Use Topics    Alcohol use: Never    Drug use: Never         Review of Systems:  Review of Systems   Constitutional:  Negative for chills and fever.   HENT:  Negative for congestion, ear pain and sore throat.    Eyes:  Negative for pain.   Respiratory:  Negative for cough, chest tightness and shortness of breath.    Cardiovascular:  Negative for chest pain.   Gastrointestinal:  Positive for vomiting. Negative for abdominal pain, diarrhea and nausea.   Genitourinary:  Negative for flank pain and hematuria.   Musculoskeletal:  Positive for myalgias. Negative for joint swelling.   Skin:  Negative for pallor.   Neurological:  Negative for seizures and headaches.   All other systems reviewed and are negative.       Physical Exam:  /91   Pulse 87   Temp 98.4 °F (36.9 °C)   Resp 18   Ht 175.3 cm (69\")   LMP  (Within Months)   SpO2 99%   BMI 41.87 kg/m² "     Physical Exam  Vitals and nursing note reviewed.   Constitutional:       General: She is not in acute distress.     Appearance: Normal appearance. She is not toxic-appearing.   HENT:      Head: Normocephalic and atraumatic.      Jaw: There is normal jaw occlusion.   Eyes:      General: Lids are normal.      Extraocular Movements: Extraocular movements intact.      Conjunctiva/sclera: Conjunctivae normal.      Pupils: Pupils are equal, round, and reactive to light.   Cardiovascular:      Rate and Rhythm: Normal rate and regular rhythm.      Pulses: Normal pulses.      Heart sounds: Normal heart sounds.   Pulmonary:      Effort: Pulmonary effort is normal. No respiratory distress.      Breath sounds: Normal breath sounds. No wheezing or rhonchi.   Abdominal:      General: Abdomen is flat.      Palpations: Abdomen is soft.      Tenderness: There is no abdominal tenderness. There is no guarding or rebound.   Musculoskeletal:         General: Normal range of motion.      Cervical back: Normal range of motion and neck supple.      Right lower leg: No edema.      Left lower leg: No edema.   Skin:     General: Skin is warm and dry.   Neurological:      Mental Status: She is alert and oriented to person, place, and time. Mental status is at baseline.   Psychiatric:         Mood and Affect: Mood normal.                Procedures:  Procedures      Medical Decision Making:      Comorbidities that affect care:    Diabetes, arthritis, fibromyalgia, migraine, anxiety, hypertension    External Notes reviewed:    Previous Clinic Note: Outpatient PCP visit 9/22/2023 for fibromyalgia      The following orders were placed and all results were independently analyzed by me:  Orders Placed This Encounter   Procedures    COVID-19, FLU A/B, RSV PCR 1 HR TAT - Swab, Nasopharynx    [place] Draw    Comprehensive Metabolic Panel    Lipase    hCG, Quantitative, Pregnancy    CBC Auto Differential    Undress & Gown    P.o. fluids  Pending sale to [facility]  Order (Specify)    ECG 12 Lead Tachycardia    CBC & Differential    Green Top (Gel)    Lavender Top    Gold Top - SST    Light Blue Top       Medications Given in the Emergency Department:  Medications - No data to display       ED Course:    ED Course as of 11/11/23 0928   Sat Nov 11, 2023   0323 My interpretation of EKG: Sinus rhythm 98, no acute ischemia, occasional ectopy [JS]      ED Course User Index  [JS] Issac Rose MD       Labs:    Lab Results (last 24 hours)       Procedure Component Value Units Date/Time    CBC & Differential [[id number]]  (Abnormal) Collected: 11/11/23 0151    Specimen: Blood Updated: 11/11/23 0157    Narrative:      The following orders were created for panel order CBC & Differential.  Procedure                               Abnormality         Status                     ---------                               -----------         ------                     CBC Auto Differential[[id number]]        Abnormal            Final result                 Please view results for these tests on the individual orders.    Comprehensive Metabolic Panel [[id number]]  (Abnormal) Collected: 11/11/23 0151    Specimen: Blood Updated: 11/11/23 0219     Glucose 132 mg/dL      BUN 28 mg/dL      Creatinine 1.15 mg/dL      Sodium 137 mmol/L      Potassium 3.2 mmol/L      Chloride 100 mmol/L      CO2 23.6 mmol/L      Calcium 9.2 mg/dL      Total Protein 7.0 g/dL      Albumin 4.3 g/dL      ALT (SGPT) 16 U/L      AST (SGOT) 17 U/L      Alkaline Phosphatase 69 U/L      Total Bilirubin <0.2 mg/dL      Globulin 2.7 gm/dL      A/G Ratio 1.6 g/dL      BUN/Creatinine Ratio 24.3     Anion Gap 13.4 mmol/L      eGFR 59.6 mL/min/1.73     Narrative:      GFR Normal >60  Chronic Kidney Disease <60  Kidney Failure <15      Lipase [[id number]]  (Normal) Collected: 11/11/23 0151    Specimen: Blood Updated: 11/11/23 0219     Lipase 41 U/L     hCG, Quantitative, Pregnancy [[id number]] Collected: 11/11/23 0151    Specimen: Blood  Updated: 11/11/23 0216     HCG Quantitative <0.50 mIU/mL     Narrative:      HCG Ranges by Gestational Age    Females - non-pregnant premenopausal   </= 1mIU/mL HCG  Females - postmenopausal               </= 7mIU/mL HCG    3 Weeks       5.4   -      72 mIU/mL  4 Weeks      10.2   -     708 mIU/mL  5 Weeks       217   -   8,245 mIU/mL  6 Weeks       152   -  32,177 mIU/mL  7 Weeks     4,059   - 153,767 mIU/mL  8 Weeks    31,366   - 149,094 mIU/mL  9 Weeks    59,109   - 135,901 mIU/mL  10 Weeks   44,186   - 170,409 mIU/mL  12 Weeks   27,107   - 201,615 mIU/mL  14 Weeks   24,302   -  93,646 mIU/mL  15 Weeks   12,540   -  69,747 mIU/mL  16 Weeks    8,904   -  55,332 mIU/mL  17 Weeks    8,240   -  51,793 mIU/mL  18 Weeks    9,649   -  55,271 mIU/mL      CBC Auto Differential [[id number]]  (Abnormal) Collected: 11/11/23 0151    Specimen: Blood Updated: 11/11/23 0157     WBC 8.58 10*3/mm3      RBC 4.19 10*6/mm3      Hemoglobin 11.7 g/dL      Hematocrit 34.8 %      MCV 83.1 fL      MCH 27.9 pg      MCHC 33.6 g/dL      RDW 12.6 %      RDW-SD 38.5 fl      MPV 9.2 fL      Platelets 365 10*3/mm3      Neutrophil % 39.7 %      Lymphocyte % 37.9 %      Monocyte % 7.7 %      Eosinophil % 13.6 %      Basophil % 0.9 %      Immature Grans % 0.2 %      Neutrophils, Absolute 3.40 10*3/mm3      Lymphocytes, Absolute 3.25 10*3/mm3      Monocytes, Absolute 0.66 10*3/mm3      Eosinophils, Absolute 1.17 10*3/mm3      Basophils, Absolute 0.08 10*3/mm3      Immature Grans, Absolute 0.02 10*3/mm3      nRBC 0.0 /100 WBC     COVID-19, FLU A/B, RSV PCR 1 HR TAT - Swab, Nasopharynx [[id number]]  (Normal) Collected: 11/11/23 0359    Specimen: Swab from Nasopharynx Updated: 11/11/23 0457     COVID19 Not Detected     Influenza A PCR Not Detected     Influenza B PCR Not Detected     RSV, PCR Not Detected    Narrative:      Fact sheet for providers: https://www.fda.gov/media/767178/download    Fact sheet for patients:  https://www.fda.gov/media/706117/download    Test performed by PCR.             Imaging:    No Radiology Exams Resulted Within Past 24 Hours      Differential Diagnosis and Discussion:    Vomiting: Differential diagnosis includes but is not limited to migraine, labyrinthine disorders, psychogenic, metabolic and endocrine causes, peptic ulcer, gastric outlet obstruction, gastritis, gastroenteritis, appendicitis, intestinal obstruction, paralytic ileus, food poisoning, cholecystitis, acute hepatitis, acute pancreatitis, acute febrile illness, and myocardial infarction.    All labs were reviewed and interpreted by me.  All X-rays impressions were independently interpreted by me.  EKG was interpreted by me.    MDM     Amount and/or Complexity of Data Reviewed  Decide to obtain previous medical records or to obtain history from someone other than the patient: yes                 Patient Care Considerations:    ANTIBIOTICS: I considered prescribing antibiotics as an outpatient however no evidence of bacterial infection.      Consultants/Shared Management Plan:    None    Social Determinants of Health:    Patient has presented with family members who are responsible, reliable and will ensure follow up care.      Disposition and Care Coordination:    Discharged: The patient is suitable and stable for discharge with no need for consideration of observation or admission.    I have explained the patient´s condition, diagnoses and treatment plan based on the information available to me at this time. I have answered questions and addressed any concerns. The patient has a good  understanding of the patient´s diagnosis, condition, and treatment plan as can be expected at this point. The vital signs have been stable. The patient´s condition is stable and appropriate for discharge from the emergency department.      The patient will pursue further outpatient evaluation with the primary care physician or other designated or  consulting physician as outlined in the discharge instructions. They are agreeable to this plan of care and follow-up instructions have been explained in detail. The patient has received these instructions in written format and have expressed an understanding of the discharge instructions. The patient is aware that any significant change in condition or worsening of symptoms should prompt an immediate return to this or the closest emergency department or call to 911.  I have explained discharge medications and the need for follow up with the patient/caretakers. This was also printed in the discharge instructions. Patient was discharged with the following medications and follow up:      Medication List        New Prescriptions      ondansetron ODT 4 MG disintegrating tablet  Commonly known as: ZOFRAN-ODT  Place 1 tablet on the tongue 4 (Four) Times a Day As Needed for Vomiting.               Where to Get Your Medications        These medications were sent to [facility] #[id number] - [place], [place] - [address] - [phone]  - [phone] FX  [address] [place] [address]      Hours: 24-hours Phone: [phone]   ondansetron ODT 4 MG disintegrating tablet      Pat Krishna PA  [address]  [address]  [place] [address]  [phone]    Schedule an appointment as soon as possible for a visit          Final diagnoses:   Myalgia   Vomiting, unspecified vomiting type, unspecified whether nausea present        ED Disposition       ED Disposition   Discharge    Condition   Stable    Comment   --               This medical record created using voice recognition software.             Issac Rose MD  11/11/23 4340

## 2023-12-15 ENCOUNTER — TELEPHONE (OUTPATIENT)
Dept: FAMILY MEDICINE CLINIC | Facility: CLINIC | Age: 48
End: 2023-12-15

## 2023-12-31 DIAGNOSIS — M79.7 FIBROMYALGIA: Chronic | ICD-10-CM

## 2024-01-02 RX ORDER — PSEUDOEPHED/ACETAMINOPH/DIPHEN 30MG-500MG
TABLET ORAL
Qty: 90 TABLET | Refills: 0 | Status: SHIPPED | OUTPATIENT
Start: 2024-01-02

## 2024-01-02 RX ORDER — MILNACIPRAN HCL 12.5 MG
1 TABLET ORAL DAILY
Qty: 90 TABLET | Refills: 0 | Status: SHIPPED | OUTPATIENT
Start: 2024-01-02

## 2024-01-25 DIAGNOSIS — I10 PRIMARY HYPERTENSION: Chronic | ICD-10-CM

## 2024-01-25 DIAGNOSIS — J30.2 SEASONAL ALLERGIES: Chronic | ICD-10-CM

## 2024-01-25 RX ORDER — HYDROCHLOROTHIAZIDE 25 MG/1
25 TABLET ORAL DAILY
Qty: 90 TABLET | Refills: 1 | OUTPATIENT
Start: 2024-01-25

## 2024-01-25 RX ORDER — LISINOPRIL 40 MG/1
40 TABLET ORAL DAILY
Qty: 90 TABLET | Refills: 1 | OUTPATIENT
Start: 2024-01-25

## 2024-01-25 RX ORDER — CETIRIZINE HYDROCHLORIDE 10 MG/1
10 TABLET ORAL DAILY
Qty: 90 TABLET | Refills: 1 | OUTPATIENT
Start: 2024-01-25

## 2024-03-09 DIAGNOSIS — I10 PRIMARY HYPERTENSION: Chronic | ICD-10-CM

## 2024-03-09 DIAGNOSIS — J30.2 SEASONAL ALLERGIES: Chronic | ICD-10-CM

## 2024-03-10 RX ORDER — HYDROCHLOROTHIAZIDE 25 MG/1
25 TABLET ORAL DAILY
Qty: 90 TABLET | Refills: 1 | OUTPATIENT
Start: 2024-03-10

## 2024-03-10 RX ORDER — CETIRIZINE HYDROCHLORIDE 10 MG/1
10 TABLET ORAL DAILY
Qty: 90 TABLET | Refills: 1 | OUTPATIENT
Start: 2024-03-10

## 2024-03-10 RX ORDER — IBUPROFEN 400 MG/1
400 TABLET ORAL 2 TIMES DAILY PRN
Qty: 60 TABLET | Refills: 1 | OUTPATIENT
Start: 2024-03-10

## 2024-03-10 RX ORDER — LISINOPRIL 40 MG/1
40 TABLET ORAL DAILY
Qty: 90 TABLET | Refills: 1 | OUTPATIENT
Start: 2024-03-10

## 2024-03-15 ENCOUNTER — OFFICE VISIT (OUTPATIENT)
Dept: FAMILY MEDICINE CLINIC | Facility: CLINIC | Age: 49
End: 2024-03-15
Payer: COMMERCIAL

## 2024-03-15 VITALS
WEIGHT: 271.8 LBS | DIASTOLIC BLOOD PRESSURE: 82 MMHG | RESPIRATION RATE: 18 BRPM | HEIGHT: 69 IN | OXYGEN SATURATION: 99 % | BODY MASS INDEX: 40.26 KG/M2 | HEART RATE: 72 BPM | SYSTOLIC BLOOD PRESSURE: 125 MMHG

## 2024-03-15 DIAGNOSIS — R53.83 OTHER FATIGUE: Primary | ICD-10-CM

## 2024-03-15 DIAGNOSIS — I10 PRIMARY HYPERTENSION: Chronic | ICD-10-CM

## 2024-03-15 DIAGNOSIS — J45.20 MILD INTERMITTENT REACTIVE AIRWAY DISEASE WITHOUT COMPLICATION: ICD-10-CM

## 2024-03-15 DIAGNOSIS — M79.7 FIBROMYALGIA: Chronic | ICD-10-CM

## 2024-03-15 DIAGNOSIS — J30.2 SEASONAL ALLERGIES: Chronic | ICD-10-CM

## 2024-03-15 PROCEDURE — 99214 OFFICE O/P EST MOD 30 MIN: CPT | Performed by: PHYSICIAN ASSISTANT

## 2024-03-15 RX ORDER — ALBUTEROL SULFATE 90 UG/1
2 AEROSOL, METERED RESPIRATORY (INHALATION) EVERY 4 HOURS PRN
Qty: 18 G | Refills: 1 | Status: SHIPPED | OUTPATIENT
Start: 2024-03-15

## 2024-03-15 RX ORDER — CETIRIZINE HYDROCHLORIDE 10 MG/1
10 TABLET ORAL DAILY
Qty: 90 TABLET | Refills: 1 | Status: SHIPPED | OUTPATIENT
Start: 2024-03-15

## 2024-03-15 RX ORDER — HYDROCHLOROTHIAZIDE 25 MG/1
25 TABLET ORAL DAILY
Qty: 90 TABLET | Refills: 1 | Status: SHIPPED | OUTPATIENT
Start: 2024-03-15

## 2024-03-15 RX ORDER — MILNACIPRAN HCL 12.5 MG
1 TABLET ORAL DAILY
Qty: 90 TABLET | Refills: 1 | Status: SHIPPED | OUTPATIENT
Start: 2024-03-15

## 2024-03-15 RX ORDER — LISINOPRIL 40 MG/1
40 TABLET ORAL DAILY
Qty: 90 TABLET | Refills: 1 | Status: SHIPPED | OUTPATIENT
Start: 2024-03-15

## 2024-03-15 NOTE — PROGRESS NOTES
Chief Complaint  Chief Complaint   Patient presents with    Follow-up     6 month     Hypertension    Fibromyalgia       Subjective          Angelita Morgan presents to [facility] for 6mth follow up.    History of Present Illness    HTN: Patient presents for hypertension management. Patient is currently taking Lisinopril and HCTZ and is consistent with medication. Patient denies chest pain, shortness of air and edema. Patient does not check blood pressure at home.     Generalized Pain: patient has been diagnosed with fibromyalgia; chronic pain for some time but worse in the past 3 mths. Tristant has tried NSAIDs, CBD oil, Elavil (felt funny), Cymbalta (dizziness) and tylenol. Patient worked on weight loss and did well and felt better with less pain but has regained weight. Trial of Savella started in September 2023; stopped due to insurance change--will attempt to restart with savings card. Patient does note fatigue.    Seasonal Allergies: started on Zyrtec 10mg daily at last visit; symptoms improved.     Labs: 9/23/23: --triglycerides (sugary, starchy part of your cholesterol) is elevated; decrease sugars and carbs in diet   Weight down 12# since last visit.    Patient mentions that she went to ER in November 2023 and labs were abnormal; reviewed and will recheck.     Colon: 3.23.16  Mammo: due (ordered but not done)    Medical History: has a past medical history of Anxiety, Arthritis, Diabetes, Essential hypertension (04/26/2021), Fibromyalgia, Heart murmur, and Migraine.   Surgical History: has a past surgical history that includes Vaginal delivery.   Family History: family history includes ADD / ADHD in an other family member; Alzheimer's disease in an other family member; Depression in an other family member; Diabetes in an other family member; Lung cancer in an other family member; Ovarian cancer in an other family member; Stroke in an other family member.   Social History:  "reports that she quit smoking about 7 years ago. Her smoking use included cigarettes. She started smoking about 30 years ago. She has a 5.8 pack-year smoking history. She has never used smokeless tobacco. She reports that she does not drink alcohol and does not use drugs.    Allergies: Clindamycin hcl, Latex, and Penicillins    Health Maintenance Due   Topic Date Due    ANNUAL PHYSICAL  05/13/2023       Immunization History   Administered Date(s) Administered    Fluzone (or Fluarix & Flulaval for VFC) >6mos 10/04/2017, 10/11/2021, 09/30/2022    Fluzone Quad >6mos (Multi-dose) 02/01/2021    Influenza TIV (IM) 10/04/2017    Influenza, Unspecified 02/01/2021, 09/30/2022    Tdap 07/11/2021       Objective     Vitals:    03/15/24 0905   BP: 125/82   BP Location: Left arm   Patient Position: Sitting   Cuff Size: Large Adult   Pulse: 72   Resp: 18   SpO2: 99%   Weight: 123 kg (271 lb 12.8 oz)   Height: 175.3 cm (69\")     Body mass index is 40.14 kg/m².     Wt Readings from Last 3 Encounters:   03/15/24 123 kg (271 lb 12.8 oz)   01/04/24 125 kg (276 lb)   09/23/23 129 kg (283 lb 8.2 oz)     BP Readings from Last 3 Encounters:   03/15/24 125/82   01/04/24 138/97   11/11/23 123/91       Patient Care Team:  Pat Krishna PA as PCP - General (Family Medicine)    Physical Exam  Vitals and nursing note reviewed.   Constitutional:       Appearance: Normal appearance. She is obese.   HENT:      Head: Normocephalic and atraumatic.   Neck:      Vascular: No carotid bruit.      Comments: Thyroid : gland size normal, nontender, no nodules or masses present on palpation   Cardiovascular:      Rate and Rhythm: Normal rate and regular rhythm.      Pulses: Normal pulses.      Heart sounds: Murmur heard.   Pulmonary:      Effort: Pulmonary effort is normal.      Breath sounds: Normal breath sounds.   Musculoskeletal:      Cervical back: Neck supple. No tenderness.      Right lower leg: No edema.      Left lower leg: No edema. "   Lymphadenopathy:      Cervical: No cervical adenopathy.   Neurological:      Mental Status: She is alert.   Psychiatric:         Mood and Affect: Mood normal.         Behavior: Behavior normal.           Result Review :                           Assessment and Plan      Diagnoses and all orders for this visit:    1. Other fatigue (Primary)  Comments:  New symptoms unsure of prognosis; check labs to further evaluate.  Orders:  -     Iron Profile; Future  -     Ferritin; Future  -     Vitamin B12; Future  -     Folate; Future  -     Vitamin D,25-Hydroxy; Future    2. Seasonal allergies  Comments:  Stable: continue with Zyrtec 10mg daily.  Orders:  -     cetirizine (zyrTEC) 10 MG tablet; Take 1 tablet by mouth Daily.  Dispense: 90 tablet; Refill: 1    3. Primary hypertension  Comments:  Stable on Lisinopril 40mg daily and HCTZ 25mg daily; check labs and follow up in 6mths.  Orders:  -     Comprehensive Metabolic Panel; Future  -     Lipid Panel; Future  -     CBC & Differential; Future  -     TSH; Future  -     hydroCHLOROthiazide 25 MG tablet; Take 1 tablet by mouth Daily.  Dispense: 90 tablet; Refill: 1  -     lisinopril (PRINIVIL,ZESTRIL) 40 MG tablet; Take 1 tablet by mouth Daily.  Dispense: 90 tablet; Refill: 1    4. Fibromyalgia  Comments:  Improved with Savella 12.5mg daily but discontinued secondary to cost; attempt to restart with savings card; provided patient with information.  Orders:  -     Milnacipran HCl (Savella) 12.5 MG tablet; Take 1 tablet by mouth Daily.  Dispense: 90 tablet; Refill: 1    5. Mild intermittent reactive airway disease without complication  Comments:  Stable: continue with Albuterol as needed.  Orders:  -     albuterol sulfate  (90 Base) MCG/ACT inhaler; Inhale 2 puffs Every 4 (Four) Hours As Needed for Wheezing.  Dispense: 18 g; Refill: 1            Follow Up     Return in about 6 months (around 9/15/2024).    Patient was given instructions and counseling regarding her  condition or for health maintenance advice. Please see specific information pulled into the AVS if appropriate.

## 2024-03-31 RX ORDER — IBUPROFEN 400 MG/1
400 TABLET ORAL 2 TIMES DAILY PRN
Qty: 60 TABLET | Refills: 1 | OUTPATIENT
Start: 2024-03-31

## 2024-05-13 ENCOUNTER — TELEPHONE (OUTPATIENT)
Dept: FAMILY MEDICINE CLINIC | Facility: CLINIC | Age: 49
End: 2024-05-13
Payer: COMMERCIAL

## 2024-06-13 ENCOUNTER — TELEPHONE (OUTPATIENT)
Dept: FAMILY MEDICINE CLINIC | Facility: CLINIC | Age: 49
End: 2024-06-13
Payer: COMMERCIAL

## 2024-09-13 ENCOUNTER — LAB (OUTPATIENT)
Dept: LAB | Facility: HOSPITAL | Age: 49
End: 2024-09-13
Payer: COMMERCIAL

## 2024-09-13 ENCOUNTER — OFFICE VISIT (OUTPATIENT)
Dept: FAMILY MEDICINE CLINIC | Facility: CLINIC | Age: 49
End: 2024-09-13
Payer: COMMERCIAL

## 2024-09-13 VITALS
WEIGHT: 262.2 LBS | DIASTOLIC BLOOD PRESSURE: 80 MMHG | BODY MASS INDEX: 38.83 KG/M2 | RESPIRATION RATE: 18 BRPM | OXYGEN SATURATION: 99 % | SYSTOLIC BLOOD PRESSURE: 138 MMHG | HEART RATE: 68 BPM | HEIGHT: 69 IN

## 2024-09-13 DIAGNOSIS — I10 PRIMARY HYPERTENSION: Chronic | ICD-10-CM

## 2024-09-13 DIAGNOSIS — I10 PRIMARY HYPERTENSION: ICD-10-CM

## 2024-09-13 DIAGNOSIS — J30.2 SEASONAL ALLERGIES: Chronic | ICD-10-CM

## 2024-09-13 DIAGNOSIS — E66.01 CLASS 2 SEVERE OBESITY DUE TO EXCESS CALORIES WITH SERIOUS COMORBIDITY AND BODY MASS INDEX (BMI) OF 38.0 TO 38.9 IN ADULT: ICD-10-CM

## 2024-09-13 DIAGNOSIS — Z12.31 ENCOUNTER FOR SCREENING MAMMOGRAM FOR MALIGNANT NEOPLASM OF BREAST: Primary | ICD-10-CM

## 2024-09-13 DIAGNOSIS — R53.83 OTHER FATIGUE: ICD-10-CM

## 2024-09-13 LAB
25(OH)D3 SERPL-MCNC: 16.1 NG/ML (ref 30–100)
ALBUMIN SERPL-MCNC: 4.5 G/DL (ref 3.5–5.2)
ALBUMIN/GLOB SERPL: 1.9 G/DL
ALP SERPL-CCNC: 64 U/L (ref 39–117)
ALT SERPL W P-5'-P-CCNC: 7 U/L (ref 1–33)
ANION GAP SERPL CALCULATED.3IONS-SCNC: 13 MMOL/L (ref 5–15)
AST SERPL-CCNC: 9 U/L (ref 1–32)
BASOPHILS # BLD AUTO: 0.06 10*3/MM3 (ref 0–0.2)
BASOPHILS NFR BLD AUTO: 1 % (ref 0–1.5)
BILIRUB SERPL-MCNC: 0.4 MG/DL (ref 0–1.2)
BUN SERPL-MCNC: 21 MG/DL (ref 6–20)
BUN/CREAT SERPL: 19.3 (ref 7–25)
CALCIUM SPEC-SCNC: 9.8 MG/DL (ref 8.6–10.5)
CHLORIDE SERPL-SCNC: 103 MMOL/L (ref 98–107)
CHOLEST SERPL-MCNC: 178 MG/DL (ref 0–200)
CO2 SERPL-SCNC: 22 MMOL/L (ref 22–29)
CREAT SERPL-MCNC: 1.09 MG/DL (ref 0.57–1)
DEPRECATED RDW RBC AUTO: 37.6 FL (ref 37–54)
EGFRCR SERPLBLD CKD-EPI 2021: 63.2 ML/MIN/1.73
EOSINOPHIL # BLD AUTO: 0.27 10*3/MM3 (ref 0–0.4)
EOSINOPHIL NFR BLD AUTO: 4.5 % (ref 0.3–6.2)
ERYTHROCYTE [DISTWIDTH] IN BLOOD BY AUTOMATED COUNT: 12.3 % (ref 12.3–15.4)
FERRITIN SERPL-MCNC: 76.1 NG/ML (ref 13–150)
FOLATE SERPL-MCNC: 10.1 NG/ML (ref 4.78–24.2)
GLOBULIN UR ELPH-MCNC: 2.4 GM/DL
GLUCOSE SERPL-MCNC: 94 MG/DL (ref 65–99)
HCT VFR BLD AUTO: 38.1 % (ref 34–46.6)
HDLC SERPL-MCNC: 63 MG/DL (ref 40–60)
HGB BLD-MCNC: 12.4 G/DL (ref 12–15.9)
IMM GRANULOCYTES # BLD AUTO: 0.02 10*3/MM3 (ref 0–0.05)
IMM GRANULOCYTES NFR BLD AUTO: 0.3 % (ref 0–0.5)
IRON 24H UR-MRATE: 77 MCG/DL (ref 37–145)
IRON SATN MFR SERPL: 18 % (ref 20–50)
LDLC SERPL CALC-MCNC: 100 MG/DL (ref 0–100)
LDLC/HDLC SERPL: 1.57 {RATIO}
LYMPHOCYTES # BLD AUTO: 2.2 10*3/MM3 (ref 0.7–3.1)
LYMPHOCYTES NFR BLD AUTO: 36.6 % (ref 19.6–45.3)
MCH RBC QN AUTO: 27.7 PG (ref 26.6–33)
MCHC RBC AUTO-ENTMCNC: 32.5 G/DL (ref 31.5–35.7)
MCV RBC AUTO: 85 FL (ref 79–97)
MONOCYTES # BLD AUTO: 0.44 10*3/MM3 (ref 0.1–0.9)
MONOCYTES NFR BLD AUTO: 7.3 % (ref 5–12)
NEUTROPHILS NFR BLD AUTO: 3.02 10*3/MM3 (ref 1.7–7)
NEUTROPHILS NFR BLD AUTO: 50.3 % (ref 42.7–76)
NRBC BLD AUTO-RTO: 0 /100 WBC (ref 0–0.2)
PLATELET # BLD AUTO: 387 10*3/MM3 (ref 140–450)
PMV BLD AUTO: 10 FL (ref 6–12)
POTASSIUM SERPL-SCNC: 4.2 MMOL/L (ref 3.5–5.2)
PROT SERPL-MCNC: 6.9 G/DL (ref 6–8.5)
RBC # BLD AUTO: 4.48 10*6/MM3 (ref 3.77–5.28)
SODIUM SERPL-SCNC: 138 MMOL/L (ref 136–145)
TIBC SERPL-MCNC: 422 MCG/DL (ref 298–536)
TRANSFERRIN SERPL-MCNC: 283 MG/DL (ref 200–360)
TRIGL SERPL-MCNC: 80 MG/DL (ref 0–150)
TSH SERPL DL<=0.05 MIU/L-ACNC: 3.07 UIU/ML (ref 0.27–4.2)
VIT B12 BLD-MCNC: 643 PG/ML (ref 211–946)
VLDLC SERPL-MCNC: 15 MG/DL (ref 5–40)
WBC NRBC COR # BLD AUTO: 6.01 10*3/MM3 (ref 3.4–10.8)

## 2024-09-13 PROCEDURE — 84466 ASSAY OF TRANSFERRIN: CPT

## 2024-09-13 PROCEDURE — 99214 OFFICE O/P EST MOD 30 MIN: CPT | Performed by: PHYSICIAN ASSISTANT

## 2024-09-13 PROCEDURE — 82746 ASSAY OF FOLIC ACID SERUM: CPT

## 2024-09-13 PROCEDURE — 83540 ASSAY OF IRON: CPT

## 2024-09-13 PROCEDURE — 80050 GENERAL HEALTH PANEL: CPT

## 2024-09-13 PROCEDURE — 80061 LIPID PANEL: CPT

## 2024-09-13 PROCEDURE — 82728 ASSAY OF FERRITIN: CPT

## 2024-09-13 PROCEDURE — 82607 VITAMIN B-12: CPT

## 2024-09-13 PROCEDURE — 36415 COLL VENOUS BLD VENIPUNCTURE: CPT

## 2024-09-13 PROCEDURE — 82306 VITAMIN D 25 HYDROXY: CPT

## 2024-09-13 RX ORDER — LISINOPRIL 40 MG/1
40 TABLET ORAL DAILY
Qty: 90 TABLET | Refills: 1 | Status: SHIPPED | OUTPATIENT
Start: 2024-09-13

## 2024-09-13 RX ORDER — HYDROCHLOROTHIAZIDE 25 MG/1
25 TABLET ORAL DAILY
Qty: 90 TABLET | Refills: 1 | Status: SHIPPED | OUTPATIENT
Start: 2024-09-13

## 2024-09-13 RX ORDER — CETIRIZINE HYDROCHLORIDE 10 MG/1
10 TABLET ORAL DAILY
Qty: 90 TABLET | Refills: 1 | Status: SHIPPED | OUTPATIENT
Start: 2024-09-13

## 2024-09-13 NOTE — PROGRESS NOTES
Chief Complaint  Chief Complaint   Patient presents with    Follow-up     6 month      Hypertension       Subjective          Angelita Morgan presents to [facility] for 6mth follow up.    History of Present Illness    HTN: Patient presents for hypertension management. Patient is currently taking Lisinopril and HCTZ and is consistent with medication. Patient denies chest pain, shortness of air and edema. Patient does not check blood pressure at home.     Generalized Pain: patient has been diagnosed with fibromyalgia; chronic pain for some time.. Raziatent has tried NSAIDs, CBD oil, Elavil (felt funny), Cymbalta (dizziness) and tylenol.  Trial of Savella started in September 2023; stopped due to insurance change--will attempt to restart with savings card. Unable to use savings card. Pain is improving with weight loss.     Seasonal Allergies: started on Zyrtec 10mg daily at last visit; symptoms improved.     Labs: ordered in March; completed this morning.     Colon: 3.23.16  Mammo: due (ordered but not done); re-ordered today.    Medical History: has a past medical history of Anxiety, Arthritis, Diabetes, Essential hypertension (04/26/2021), Fibromyalgia, Heart murmur, and Migraine.   Surgical History: has a past surgical history that includes Vaginal delivery.   Family History: family history includes ADD / ADHD in an other family member; Alzheimer's disease in an other family member; Depression in an other family member; Diabetes in an other family member; Lung cancer in an other family member; Ovarian cancer in an other family member; Stroke in an other family member.   Social History: reports that she quit smoking about 7 years ago. Her smoking use included cigarettes. She started smoking about 30 years ago. She has a 5.8 pack-year smoking history. She has never used smokeless tobacco. She reports that she does not drink alcohol and does not use drugs.    Allergies: Clindamycin hcl,  "Latex, and Penicillins    Health Maintenance Due   Topic Date Due    MAMMOGRAM  Never done    ANNUAL PHYSICAL  05/13/2023       Objective     Vitals:    09/13/24 0926 09/13/24 0948   BP: 146/84 138/80   Pulse: 68    Resp: 18    SpO2: 99%    Weight: 119 kg (262 lb 3.2 oz)    Height: 175.3 cm (69\")      Body mass index is 38.72 kg/m².     Wt Readings from Last 3 Encounters:   09/13/24 119 kg (262 lb 3.2 oz)   03/15/24 123 kg (271 lb 12.8 oz)   01/04/24 125 kg (276 lb)     BP Readings from Last 3 Encounters:   09/13/24 138/80   03/15/24 125/82   01/04/24 138/97       Class 3 Severe Obesity (BMI >=40). Obesity-related health conditions include the following: hypertension. Obesity is improving with lifestyle modifications. BMI is is above average; BMI management plan is completed. We discussed portion control and increasing exercise.      Patient Care Team:  Pat Krishna PA as PCP - General (Family Medicine)    Physical Exam  Vitals and nursing note reviewed.   Constitutional:       Appearance: Normal appearance. She is obese.   HENT:      Head: Normocephalic and atraumatic.   Neck:      Vascular: No carotid bruit.      Comments: Thyroid : gland size normal, nontender, no nodules or masses present on palpation   Cardiovascular:      Rate and Rhythm: Normal rate and regular rhythm.      Pulses: Normal pulses.      Heart sounds: Normal heart sounds.   Pulmonary:      Effort: Pulmonary effort is normal.      Breath sounds: Normal breath sounds.   Musculoskeletal:      Cervical back: Neck supple. No tenderness.      Right lower leg: No edema.      Left lower leg: No edema.   Lymphadenopathy:      Cervical: No cervical adenopathy.   Neurological:      Mental Status: She is alert.   Psychiatric:         Mood and Affect: Mood normal.         Behavior: Behavior normal.           Result Review :   Labs pending.           Assessment and Plan      Diagnoses and all orders for this visit:    1. Encounter for screening " mammogram for malignant neoplasm of breast (Primary)  -     Mammo Screening Digital Tomosynthesis Bilateral With CAD; Future    2. Seasonal allergies  Comments:  Stable: continue with Zyrtec 10mg daily.  Orders:  -     cetirizine (zyrTEC) 10 MG tablet; Take 1 tablet by mouth Daily.  Dispense: 90 tablet; Refill: 1    3. Primary hypertension  Comments:  Stable on Lisinopril 40mg daily and HCTZ 25mg daily; check labs and follow up in 6mths.  Orders:  -     hydroCHLOROthiazide 25 MG tablet; Take 1 tablet by mouth Daily.  Dispense: 90 tablet; Refill: 1  -     lisinopril (PRINIVIL,ZESTRIL) 40 MG tablet; Take 1 tablet by mouth Daily.  Dispense: 90 tablet; Refill: 1    4. Class 2 severe obesity due to excess calories with serious comorbidity and body mass index (BMI) of 38.0 to 38.9 in adult            Follow Up     Return in about 6 months (around 3/13/2025).    Patient was given instructions and counseling regarding her condition or for health maintenance advice. Please see specific information pulled into the AVS if appropriate.

## 2024-09-16 ENCOUNTER — TELEPHONE (OUTPATIENT)
Dept: FAMILY MEDICINE CLINIC | Facility: CLINIC | Age: 49
End: 2024-09-16
Payer: COMMERCIAL

## 2025-03-26 DIAGNOSIS — J30.2 SEASONAL ALLERGIES: Chronic | ICD-10-CM

## 2025-03-26 DIAGNOSIS — I10 PRIMARY HYPERTENSION: Chronic | ICD-10-CM

## 2025-03-26 RX ORDER — HYDROCHLOROTHIAZIDE 25 MG/1
25 TABLET ORAL DAILY
Qty: 90 TABLET | Refills: 0 | Status: SHIPPED | OUTPATIENT
Start: 2025-03-26

## 2025-03-26 RX ORDER — CETIRIZINE HYDROCHLORIDE 10 MG/1
10 TABLET ORAL DAILY
Qty: 90 TABLET | Refills: 0 | Status: SHIPPED | OUTPATIENT
Start: 2025-03-26

## 2025-03-26 RX ORDER — LISINOPRIL 40 MG/1
40 TABLET ORAL DAILY
Qty: 90 TABLET | Refills: 0 | Status: SHIPPED | OUTPATIENT
Start: 2025-03-26

## 2025-03-26 NOTE — TELEPHONE ENCOUNTER
ZYRTEC  LRF 9/13/2024  LOV 9/13/2024  F/U none on file     HYDROCHLOROTHIAZIDE  LRF 9/13/2024  LOV 9/13/2024  F/U none on file     LISINOPRIL  LRF 9/13/2024  LOV 9/13/2024  F/U none on file

## 2025-03-28 NOTE — TELEPHONE ENCOUNTER
HUB TO RELAY AND SCHEDULE     Called patient to schedule an appt in the next 90 days, LVM x2

## 2025-03-31 NOTE — TELEPHONE ENCOUNTER
HUB TO RELAY AND SCHEDULE     Called patient to schedule an appt in the next 90 days, LVM x3

## 2025-04-10 NOTE — PROGRESS NOTES
Chief Complaint  Chief Complaint   Patient presents with    Follow-up     6 month     Hypertension       Subjective          Angelita Morgan presents to [facility] for 6mth follow up.    History of Present Illness    HTN: Patient presents for hypertension management. Patient is currently taking Lisinopril and HCTZ and is consistent with medication. Patient denies chest pain, shortness of air and edema. Patient does not check blood pressure at home.     Generalized Pain: patient has been diagnosed with fibromyalgia; chronic pain for some time.. Paitent has tried NSAIDs, CBD oil, Elavil (felt funny), Cymbalta (dizziness) and tylenol.  Trial of Savella started in September 2023; stopped due to insurance change--will attempt to restart with savings card. Unable to use savings card. Pain is improving with weight loss.    Intermittent right shin pain for 1 week; no injury but notes muscle spasms in calf.     Seasonal Allergies: started on Zyrtec 10mg daily; symptoms improved.     Labs: 9/13/24  - Creatinine is minimally elevated but stable; eGFR is normal; ensure adequate hydration with water.  Patient asked about the narrative portion of CMP which states chronic kidney disease/kidney failure numbers--these are just reference ranges. Her GFR is over 60 which is normal.   --vitamin D level is low: start OTC vitamin D 2000iu daily or increase current amount by 2000iu daily      Colon: 3.23.16  Mammo: due (ordered but not done; fave number for scheduling).    Medical History: has a past medical history of Anxiety, Arthritis, Diabetes, Essential hypertension (04/26/2021), Fibromyalgia, Heart murmur, and Migraine.   Surgical History: has a past surgical history that includes Vaginal delivery.   Family History: family history includes ADD / ADHD in an other family member; Alzheimer's disease in an other family member; Depression in an other family member; Diabetes in an other family member; Lung  cancer in an other family member; Ovarian cancer in an other family member; Stroke in an other family member.   Social History: reports that she quit smoking about 8 years ago. Her smoking use included cigarettes. She started smoking about 31 years ago. She has a 5.8 pack-year smoking history. She has never used smokeless tobacco. She reports that she does not drink alcohol and does not use drugs.    Allergies: Clindamycin hcl, Latex, and Penicillins    Health Maintenance Due   Topic Date Due    MAMMOGRAM  Never done    ANNUAL PHYSICAL  05/13/2023       Objective     Vitals:    04/11/25 1001 04/11/25 1019   BP: (!) 132/102 128/80   BP Location: Left arm    Patient Position: Sitting    Cuff Size: Large Adult    Pulse: 75    Resp: 14    SpO2: 99%    Weight: 111 kg (245 lb 6.4 oz)      Body mass index is 36.24 kg/m².     Wt Readings from Last 3 Encounters:   04/11/25 111 kg (245 lb 6.4 oz)   09/13/24 119 kg (262 lb 3.2 oz)   03/15/24 123 kg (271 lb 12.8 oz)     BP Readings from Last 3 Encounters:   04/11/25 128/80   09/13/24 138/80   03/15/24 125/82       Patient Care Team:  Pat Krishna PA as PCP - General (Family Medicine)    Physical Exam  Vitals and nursing note reviewed.   Constitutional:       Appearance: Normal appearance. She is obese.   HENT:      Head: Normocephalic and atraumatic.   Neck:      Vascular: No carotid bruit.      Comments: Thyroid : gland size normal, nontender, no nodules or masses present on palpation   Cardiovascular:      Rate and Rhythm: Normal rate and regular rhythm.      Pulses: Normal pulses.      Heart sounds: Murmur heard.   Pulmonary:      Effort: Pulmonary effort is normal.      Breath sounds: Normal breath sounds.   Musculoskeletal:      Cervical back: Neck supple. No tenderness.      Right lower leg: No edema.      Left lower leg: No edema.   Lymphadenopathy:      Cervical: No cervical adenopathy.   Neurological:      Mental Status: She is alert.   Psychiatric:          Mood and Affect: Mood normal.         Behavior: Behavior normal.           Result Review :              Assessment and Plan      Diagnoses and all orders for this visit:    1. Primary hypertension (Primary)  Comments:  Stable on lisinopril 40 Mg daily and hydrochlorothiazide 25 Mg daily, continue, check labs and follow-up in 6 months  Orders:  -     Comprehensive Metabolic Panel; Future  -     Lipid Panel; Future  -     CBC & Differential; Future  -     TSH; Future    2. Seasonal allergies  Comments:  Stable on cetirizine 10 Mg daily, continue and follow-up in 6 months    3. Pain in right shin  Comments:  Discussed voltaren gel; heat, stretching and rest. F/u if no improvement.            Follow Up     Return in about 6 months (around 10/11/2025).    Patient was given instructions and counseling regarding her condition or for health maintenance advice. Please see specific information pulled into the AVS if appropriate.

## 2025-04-11 ENCOUNTER — OFFICE VISIT (OUTPATIENT)
Dept: FAMILY MEDICINE CLINIC | Facility: CLINIC | Age: 50
End: 2025-04-11
Payer: COMMERCIAL

## 2025-04-11 VITALS
HEART RATE: 75 BPM | BODY MASS INDEX: 36.24 KG/M2 | SYSTOLIC BLOOD PRESSURE: 128 MMHG | DIASTOLIC BLOOD PRESSURE: 80 MMHG | WEIGHT: 245.4 LBS | RESPIRATION RATE: 14 BRPM | OXYGEN SATURATION: 99 %

## 2025-04-11 DIAGNOSIS — M79.661 PAIN IN RIGHT SHIN: ICD-10-CM

## 2025-04-11 DIAGNOSIS — J30.2 SEASONAL ALLERGIES: ICD-10-CM

## 2025-04-11 DIAGNOSIS — I10 PRIMARY HYPERTENSION: Primary | Chronic | ICD-10-CM

## 2025-04-11 PROCEDURE — 99214 OFFICE O/P EST MOD 30 MIN: CPT | Performed by: PHYSICIAN ASSISTANT

## 2025-06-23 DIAGNOSIS — J30.2 SEASONAL ALLERGIES: Chronic | ICD-10-CM

## 2025-06-23 DIAGNOSIS — I10 PRIMARY HYPERTENSION: Chronic | ICD-10-CM

## 2025-06-23 RX ORDER — HYDROCHLOROTHIAZIDE 25 MG/1
25 TABLET ORAL DAILY
Qty: 90 TABLET | Refills: 1 | Status: SHIPPED | OUTPATIENT
Start: 2025-06-23

## 2025-06-23 RX ORDER — CETIRIZINE HYDROCHLORIDE 10 MG/1
10 TABLET ORAL DAILY
Qty: 90 TABLET | Refills: 1 | Status: SHIPPED | OUTPATIENT
Start: 2025-06-23

## 2025-06-23 RX ORDER — LISINOPRIL 40 MG/1
40 TABLET ORAL DAILY
Qty: 90 TABLET | Refills: 1 | Status: SHIPPED | OUTPATIENT
Start: 2025-06-23

## 2025-06-23 NOTE — TELEPHONE ENCOUNTER
cetirizine (zyrTEC) 10 MG tablet     hydroCHLOROthiazide 25 MG tablet     lisinopril (PRINIVIL,ZESTRIL) 40 MG tablet     90/0  LF 3.26.25  LOV 4/11/25  NA 10.17.25

## 2025-07-04 ENCOUNTER — APPOINTMENT (OUTPATIENT)
Dept: GENERAL RADIOLOGY | Facility: HOSPITAL | Age: 50
End: 2025-07-04
Payer: COMMERCIAL

## 2025-07-04 ENCOUNTER — HOSPITAL ENCOUNTER (EMERGENCY)
Facility: HOSPITAL | Age: 50
Discharge: HOME | End: 2025-07-04
Attending: EMERGENCY MEDICINE
Payer: COMMERCIAL

## 2025-07-04 VITALS
HEART RATE: 82 BPM | RESPIRATION RATE: 18 BRPM | BODY MASS INDEX: 37.06 KG/M2 | HEIGHT: 69 IN | SYSTOLIC BLOOD PRESSURE: 148 MMHG | DIASTOLIC BLOOD PRESSURE: 85 MMHG | TEMPERATURE: 98.2 F | WEIGHT: 250.22 LBS | OXYGEN SATURATION: 99 %

## 2025-07-04 DIAGNOSIS — M79.18 MUSCULOSKELETAL PAIN: Primary | ICD-10-CM

## 2025-07-04 PROCEDURE — 99283 EMERGENCY DEPT VISIT LOW MDM: CPT

## 2025-07-04 PROCEDURE — 73590 X-RAY EXAM OF LOWER LEG: CPT

## 2025-07-04 RX ORDER — KETOROLAC TROMETHAMINE 10 MG/1
10 TABLET, FILM COATED ORAL EVERY 6 HOURS PRN
Qty: 15 TABLET | Refills: 0 | Status: SHIPPED | OUTPATIENT
Start: 2025-07-04

## 2025-07-04 RX ORDER — KETOROLAC TROMETHAMINE 10 MG/1
10 TABLET, FILM COATED ORAL ONCE AS NEEDED
Status: DISCONTINUED | OUTPATIENT
Start: 2025-07-04 | End: 2025-07-04 | Stop reason: HOSPADM

## 2025-07-04 NOTE — ED PROVIDER NOTES
"SHARED VISIT ATTESTATION:    This visit was performed by myself and an APC.  I personally approved the management plan/medical decision making and take responsibility for the patient management.      SHARED VISIT NOTE:    Patient is 50 y.o. year old female that presents to the ED for evaluation of right leg pain ongoing for about 3 months.    Physical Exam    ED Course:    /85 (BP Location: Right arm, Patient Position: Sitting)   Pulse 82   Temp 98.2 °F (36.8 °C) (Oral)   Resp 18   Ht 175.3 cm (69\")   Wt 113 kg (250 lb 3.6 oz)   SpO2 99%   BMI 36.95 kg/m²       The following orders were placed and all results were independently analyzed by me:  Orders Placed This Encounter   Procedures    XR Tibia Fibula 2 View Right       Medications Given in the Emergency Department:  Medications   ketorolac (TORADOL) tablet 10 mg (has no administration in time range)        ED Course:         Labs:    Lab Results (last 24 hours)       ** No results found for the last 24 hours. **             Imaging:    XR Tibia Fibula 2 View Right  Result Date: 7/4/2025  XR TIBIA FIBULA 2 VW RIGHT Date of Exam: 7/4/2025 1:13 PM EDT Indication: right leg pain Comparison: None available. Findings: No evidence of acute fracture nor dislocation. Alignment is normal. Joint space is adequately maintained. Soft tissues are unremarkable.     Impression: No acute osseous abnormality of the right tibia or fibula. Electronically Signed: Gracy Morton MD  7/4/2025 1:47 PM EDT  Workstation ID: [id number]      MDM:  Right leg pain.  Negative x-ray.  Follow-up outpatient.    Procedures    X-ray were performed in the emergency department and all X-ray impressions were independently interpreted by me.                     Juan Alberto Davies MD  14:26 EDT  07/04/25         Juan Alberto Davies MD  07/04/25 1428    "

## 2025-07-04 NOTE — ED PROVIDER NOTES
Time: 1:59 PM EDT  Date of encounter:  7/4/2025  Independent Historian/Clinical History and Information was obtained by:   Patient    History is limited by: N/A    Chief Complaint: extremity pain      History of Present Illness:  Patient is a 50 y.o. year old female who presents to the emergency department for evaluation of right leg pain ongoing for 3 months.  Patient denies any injuries.  Patient reports swelling of bilateral lower extremities around 1 month ago that resolved.  Denies any chest pain shortness of breath.  Denies any current lower extremity swelling.      Patient Care Team  Primary Care Provider: Pat Krishna PA    Past Medical History:     Allergies   Allergen Reactions    Clindamycin Hcl Nausea And Vomiting    Latex Rash    Penicillins Nausea And Vomiting     Past Medical History:   Diagnosis Date    Anxiety     Arthritis     Diabetes     Essential hypertension 04/26/2021    Fibromyalgia     Heart murmur     Migraine      Past Surgical History:   Procedure Laterality Date    VAGINAL DELIVERY       Family History   Problem Relation Age of Onset    Depression Other     Stroke Other     Alzheimer's disease Other     Lung cancer Other     Ovarian cancer Other         family history    ADD / ADHD Other     Diabetes Other        Home Medications:  Prior to Admission medications    Medication Sig Start Date End Date Taking? Authorizing Provider   Acetaminophen Extra Strength 500 MG tablet TAKE 1 CAPSULE BY MOUTH EVERY 8 (EIGHT) HOURS AS NEEDED FOR MODERATE PAIN. 1/2/24   Pat Krishna PA   CBD (cannabidiol) oral oil Take 1 drop by mouth Every 6 (Six) Hours.    Provider, MD Ector   cetirizine (zyrTEC) 10 MG tablet TAKE 1 TABLET BY MOUTH EVERY DAY 6/23/25   Pat Krishna PA   hydroCHLOROthiazide 25 MG tablet TAKE 1 TABLET BY MOUTH EVERY DAY 6/23/25   Pat Krishna PA   ibuprofen (ADVIL,MOTRIN) 400 MG tablet Take 1 tablet by mouth 2 (Two) Times a Day As Needed  "for Moderate Pain. 10/9/23   Pat Krishna PA   lisinopril (PRINIVIL,ZESTRIL) 40 MG tablet TAKE 1 TABLET BY MOUTH EVERY DAY 25   Pat Krishna PA        Social History:   Social History     Tobacco Use    Smoking status: Former     Current packs/day: 0.00     Average packs/day: 0.3 packs/day for 23.0 years (5.8 ttl pk-yrs)     Types: Cigarettes     Start date:      Quit date: 2017     Years since quittin.5    Smokeless tobacco: Never   Vaping Use    Vaping status: Never Used   Substance Use Topics    Alcohol use: Never    Drug use: Never         Review of Systems:  Review of Systems   Constitutional:  Negative for chills, fatigue and fever.   HENT:  Negative for ear pain, rhinorrhea and sore throat.    Eyes:  Negative for visual disturbance.   Respiratory:  Negative for cough and shortness of breath.    Cardiovascular:  Negative for chest pain.   Gastrointestinal:  Negative for abdominal pain, diarrhea and vomiting.   Genitourinary:  Negative for difficulty urinating.   Musculoskeletal:  Positive for arthralgias. Negative for back pain and myalgias.   Skin:  Negative for rash.   Neurological:  Negative for light-headedness and headaches.   Hematological:  Negative for adenopathy.   Psychiatric/Behavioral: Negative.          Physical Exam:  /85 (BP Location: Right arm, Patient Position: Sitting)   Pulse 82   Temp 98.2 °F (36.8 °C) (Oral)   Resp 18   Ht 175.3 cm (69\")   Wt 113 kg (250 lb 3.6 oz)   SpO2 99%   BMI 36.95 kg/m²     Physical Exam  Vitals and nursing note reviewed.   Constitutional:       General: She is not in acute distress.     Appearance: Normal appearance. She is not toxic-appearing.   HENT:      Head: Normocephalic and atraumatic.      Nose: Nose normal.      Mouth/Throat:      Mouth: Mucous membranes are moist.   Eyes:      Conjunctiva/sclera: Conjunctivae normal.   Cardiovascular:      Rate and Rhythm: Normal rate and regular rhythm.      Pulses: Normal " pulses.      Heart sounds: Normal heart sounds.   Pulmonary:      Effort: Pulmonary effort is normal.      Breath sounds: Normal breath sounds.   Abdominal:      General: Bowel sounds are normal.      Palpations: Abdomen is soft.      Tenderness: There is no abdominal tenderness.   Musculoskeletal:         General: Tenderness (Right anterior lower extremity) present. No swelling. Normal range of motion.      Cervical back: Normal range of motion.   Skin:     General: Skin is warm and dry.   Neurological:      General: No focal deficit present.      Mental Status: She is alert and oriented to person, place, and time.   Psychiatric:         Mood and Affect: Mood normal.         Behavior: Behavior normal.         Thought Content: Thought content normal.         Judgment: Judgment normal.                    Medical Decision Making:      Comorbidities that affect care:    Diabetes    External Notes reviewed:    None      The following orders were placed and all results were independently analyzed by me:  Orders Placed This Encounter   Procedures    XR Tibia Fibula 2 View Right       Medications Given in the Emergency Department:  Medications   ketorolac (TORADOL) tablet 10 mg (has no administration in time range)        ED Course:         Labs:    Lab Results (last 24 hours)       ** No results found for the last 24 hours. **             Imaging:    XR Tibia Fibula 2 View Right  Result Date: 7/4/2025  XR TIBIA FIBULA 2 VW RIGHT Date of Exam: 7/4/2025 1:13 PM EDT Indication: right leg pain Comparison: None available. Findings: No evidence of acute fracture nor dislocation. Alignment is normal. Joint space is adequately maintained. Soft tissues are unremarkable.     Impression: No acute osseous abnormality of the right tibia or fibula. Electronically Signed: Gracy Morton MD  7/4/2025 1:47 PM EDT  Workstation ID: [id number]        Differential Diagnosis and Discussion:    Extremity Pain: Differential diagnosis includes but  is not limited to soft tissue sprain, tendonitis, tendon injury, dislocation, fracture, deep vein thrombosis, arterial insufficiency, osteoarthritis, bursitis, and ligamentous damage.    PROCEDURES:    X-ray were performed in the emergency department and all X-ray impressions were independently interpreted by me.    No orders to display       Procedures    MDM  Number of Diagnoses or Management Options  Musculoskeletal pain  Diagnosis management comments: I have explained the patient´s condition, diagnoses and treatment plan based on the information available to me at this time. I have answered questions and addressed any concerns. The patient has a good  understanding of the patient´s diagnosis, condition, and treatment plan as can be expected at this point. The vital signs have been stable. The patient´s condition is stable and appropriate for discharge from the emergency department.      The patient will pursue further outpatient evaluation with the primary care physician or other designated or consulting physician as outlined in the discharge instructions. They are agreeable to this plan of care and follow-up instructions have been explained in detail. The patient has received these instructions in written format and has expressed an understanding of the discharge instructions. The patient is aware that any significant change in condition or worsening of symptoms should prompt an immediate return to this or the closest emergency department or call to 911.                        Patient Care Considerations:    NARCOTICS: I considered prescribing opiate pain medication as an outpatient, however patient's is manageable without use of narcotics in the ED.      Consultants/Shared Management Plan:    None    Social Determinants of Health:    Patient is independent, reliable, and has access to care.       Disposition and Care Coordination:    Discharged: The patient is suitable and stable for discharge with no need for  consideration of admission.    I have explained the patient´s condition, diagnoses and treatment plan based on the information available to me at this time. I have answered questions and addressed any concerns. The patient has a good  understanding of the patient´s diagnosis, condition, and treatment plan as can be expected at this point. The vital signs have been stable. The patient´s condition is stable and appropriate for discharge from the emergency department.      The patient will pursue further outpatient evaluation with the primary care physician or other designated or consulting physician as outlined in the discharge instructions. They are agreeable to this plan of care and follow-up instructions have been explained in detail. The patient has received these instructions in written format and has expressed an understanding of the discharge instructions. The patient is aware that any significant change in condition or worsening of symptoms should prompt an immediate return to this or the closest emergency department or call to 911.  I have explained discharge medications and the need for follow up with the patient/caretakers. This was also printed in the discharge instructions. Patient was discharged with the following medications and follow up:      Medication List        New Prescriptions      ketorolac 10 MG tablet  Commonly known as: TORADOL  Take 1 tablet by mouth Every 6 (Six) Hours As Needed for Moderate Pain.               Where to Get Your Medications        These medications were sent to [facility]/pharmacy #[id number] - [place], [place] - [address] - [phone]  - [phone] FX  [address] [place] [address]      Hours: 24-hours Phone: [phone]   ketorolac 10 MG tablet      Pat Krishna PA  [address] [facility] [address]  [place] [id number] [phone]    Go to   As needed       Final diagnoses:   Musculoskeletal pain        ED Disposition       ED Disposition   Discharge     Condition   Stable    Comment   --               This medical record created using voice recognition software.             Elana Matute, APRN  07/04/25 1428

## 2025-07-04 NOTE — DISCHARGE INSTRUCTIONS
Please help with your primary care provider next week for reevaluation if pain persist.  You have been prescribed anti-inflammatory pain medication to  at your pharmacy and begin taking today.  You have been given first dose here in the ED.  Return to the ED for any new or worsening concerning symptoms.

## 2025-07-08 ENCOUNTER — APPOINTMENT (OUTPATIENT)
Dept: GENERAL RADIOLOGY | Facility: HOSPITAL | Age: 50
End: 2025-07-08
Payer: COMMERCIAL

## 2025-07-08 ENCOUNTER — HOSPITAL ENCOUNTER (EMERGENCY)
Facility: HOSPITAL | Age: 50
Discharge: HOME | End: 2025-07-08
Attending: EMERGENCY MEDICINE | Admitting: EMERGENCY MEDICINE
Payer: COMMERCIAL

## 2025-07-08 VITALS
SYSTOLIC BLOOD PRESSURE: 115 MMHG | TEMPERATURE: 97.9 F | HEART RATE: 52 BPM | DIASTOLIC BLOOD PRESSURE: 74 MMHG | BODY MASS INDEX: 36.96 KG/M2 | OXYGEN SATURATION: 100 % | HEIGHT: 69 IN | WEIGHT: 249.56 LBS | RESPIRATION RATE: 16 BRPM

## 2025-07-08 DIAGNOSIS — F41.9 ANXIETY: Primary | ICD-10-CM

## 2025-07-08 DIAGNOSIS — R06.00 DYSPNEA, UNSPECIFIED TYPE: ICD-10-CM

## 2025-07-08 LAB
ALBUMIN SERPL-MCNC: 4.5 G/DL (ref 3.5–5.2)
ALBUMIN/GLOB SERPL: 1.8 G/DL
ALP SERPL-CCNC: 67 U/L (ref 39–117)
ALT SERPL W P-5'-P-CCNC: 18 U/L (ref 1–33)
ANION GAP SERPL CALCULATED.3IONS-SCNC: 11.7 MMOL/L (ref 5–15)
AST SERPL-CCNC: 18 U/L (ref 1–32)
BASOPHILS # BLD AUTO: 0.04 10*3/MM3 (ref 0–0.2)
BASOPHILS NFR BLD AUTO: 0.7 % (ref 0–1.5)
BILIRUB SERPL-MCNC: 0.3 MG/DL (ref 0–1.2)
BUN SERPL-MCNC: 18.6 MG/DL (ref 6–20)
BUN/CREAT SERPL: 15 (ref 7–25)
CALCIUM SPEC-SCNC: 9 MG/DL (ref 8.6–10.5)
CHLORIDE SERPL-SCNC: 96 MMOL/L (ref 98–107)
CO2 SERPL-SCNC: 22.3 MMOL/L (ref 22–29)
CREAT SERPL-MCNC: 1.24 MG/DL (ref 0.57–1)
DEPRECATED RDW RBC AUTO: 37.7 FL (ref 37–54)
EGFRCR SERPLBLD CKD-EPI 2021: 53.8 ML/MIN/1.73
EOSINOPHIL # BLD AUTO: 0.19 10*3/MM3 (ref 0–0.4)
EOSINOPHIL NFR BLD AUTO: 3.1 % (ref 0.3–6.2)
ERYTHROCYTE [DISTWIDTH] IN BLOOD BY AUTOMATED COUNT: 11.9 % (ref 12.3–15.4)
GLOBULIN UR ELPH-MCNC: 2.5 GM/DL
GLUCOSE SERPL-MCNC: 109 MG/DL (ref 65–99)
HCT VFR BLD AUTO: 33.2 % (ref 34–46.6)
HGB BLD-MCNC: 11 G/DL (ref 12–15.9)
IMM GRANULOCYTES # BLD AUTO: 0.01 10*3/MM3 (ref 0–0.05)
IMM GRANULOCYTES NFR BLD AUTO: 0.2 % (ref 0–0.5)
LYMPHOCYTES # BLD AUTO: 1.78 10*3/MM3 (ref 0.7–3.1)
LYMPHOCYTES NFR BLD AUTO: 29.1 % (ref 19.6–45.3)
MCH RBC QN AUTO: 28.6 PG (ref 26.6–33)
MCHC RBC AUTO-ENTMCNC: 33.1 G/DL (ref 31.5–35.7)
MCV RBC AUTO: 86.5 FL (ref 79–97)
MONOCYTES # BLD AUTO: 0.44 10*3/MM3 (ref 0.1–0.9)
MONOCYTES NFR BLD AUTO: 7.2 % (ref 5–12)
NEUTROPHILS NFR BLD AUTO: 3.66 10*3/MM3 (ref 1.7–7)
NEUTROPHILS NFR BLD AUTO: 59.7 % (ref 42.7–76)
NRBC BLD AUTO-RTO: 0 /100 WBC (ref 0–0.2)
PLATELET # BLD AUTO: 323 10*3/MM3 (ref 140–450)
PMV BLD AUTO: 9.3 FL (ref 6–12)
POTASSIUM SERPL-SCNC: 3.7 MMOL/L (ref 3.5–5.2)
PROT SERPL-MCNC: 7 G/DL (ref 6–8.5)
RBC # BLD AUTO: 3.84 10*6/MM3 (ref 3.77–5.28)
SODIUM SERPL-SCNC: 130 MMOL/L (ref 136–145)
WBC NRBC COR # BLD AUTO: 6.12 10*3/MM3 (ref 3.4–10.8)

## 2025-07-08 PROCEDURE — 80053 COMPREHEN METABOLIC PANEL: CPT | Performed by: EMERGENCY MEDICINE

## 2025-07-08 PROCEDURE — 99283 EMERGENCY DEPT VISIT LOW MDM: CPT

## 2025-07-08 PROCEDURE — 85025 COMPLETE CBC W/AUTO DIFF WBC: CPT | Performed by: EMERGENCY MEDICINE

## 2025-07-08 PROCEDURE — 71045 X-RAY EXAM CHEST 1 VIEW: CPT

## 2025-07-08 PROCEDURE — 36415 COLL VENOUS BLD VENIPUNCTURE: CPT

## 2025-07-08 RX ORDER — HYDROXYZINE HYDROCHLORIDE 25 MG/1
25 TABLET, FILM COATED ORAL 3 TIMES DAILY PRN
Qty: 20 TABLET | Refills: 0 | Status: SHIPPED | OUTPATIENT
Start: 2025-07-08

## 2025-07-08 RX ORDER — LORAZEPAM 0.5 MG/1
1 TABLET ORAL ONCE
Status: COMPLETED | OUTPATIENT
Start: 2025-07-08 | End: 2025-07-08

## 2025-07-08 RX ADMIN — LORAZEPAM 1 MG: 0.5 TABLET ORAL at 06:33

## 2025-07-08 NOTE — ED PROVIDER NOTES
Time: 6:29 AM EDT  Date of encounter:  7/8/2025  Independent Historian/Clinical History and Information was obtained by:   Patient    History is limited by: N/A    Chief Complaint: anxiety      History of Present Illness:  Patient is a 50 y.o. year old female who presents to the emergency department for evaluation of Insomnia.  Patient states every time she tries lay down she gets anxious and feels like she cannot breathe.  She denies any thing specifically bothering her.  She denies any history of heart or lung disease.  No fever or chills.  No cough or congestion.      Patient Care Team  Primary Care Provider: Pat Krishna PA    Past Medical History:     Allergies   Allergen Reactions    Clindamycin Hcl Nausea And Vomiting    Latex Rash    Penicillins Nausea And Vomiting     Past Medical History:   Diagnosis Date    Anxiety     Arthritis     Diabetes     Essential hypertension 04/26/2021    Fibromyalgia     Heart murmur     Migraine      Past Surgical History:   Procedure Laterality Date    VAGINAL DELIVERY       Family History   Problem Relation Age of Onset    Depression Other     Stroke Other     Alzheimer's disease Other     Lung cancer Other     Ovarian cancer Other         family history    ADD / ADHD Other     Diabetes Other        Home Medications:  Prior to Admission medications    Medication Sig Start Date End Date Taking? Authorizing Provider   Acetaminophen Extra Strength 500 MG tablet TAKE 1 CAPSULE BY MOUTH EVERY 8 (EIGHT) HOURS AS NEEDED FOR MODERATE PAIN. 1/2/24   Pat Krishna PA   CBD (cannabidiol) oral oil Take 1 drop by mouth Every 6 (Six) Hours.    Provider, MD Ector   cetirizine (zyrTEC) 10 MG tablet TAKE 1 TABLET BY MOUTH EVERY DAY 6/23/25   Pat Krishna PA   hydroCHLOROthiazide 25 MG tablet TAKE 1 TABLET BY MOUTH EVERY DAY 6/23/25   Pat Krishna PA   ketorolac (TORADOL) 10 MG tablet Take 1 tablet by mouth Every 6 (Six) Hours As Needed for  "Moderate Pain. 25   Elana Matute, ANTON   lisinopril (PRINIVIL,ZESTRIL) 40 MG tablet TAKE 1 TABLET BY MOUTH EVERY DAY 25   Pat Krishna PA        Social History:   Social History     Tobacco Use    Smoking status: Former     Current packs/day: 0.00     Average packs/day: 0.3 packs/day for 23.0 years (5.8 ttl pk-yrs)     Types: Cigarettes     Start date:      Quit date: 2017     Years since quittin.5    Smokeless tobacco: Never   Vaping Use    Vaping status: Never Used   Substance Use Topics    Alcohol use: Never    Drug use: Never         Review of Systems:  Review of Systems   Constitutional:  Negative for chills and fever.   HENT:  Negative for congestion, ear pain and sore throat.    Eyes:  Negative for pain.   Respiratory:  Positive for shortness of breath. Negative for cough and chest tightness.    Cardiovascular:  Negative for chest pain.   Gastrointestinal:  Negative for abdominal pain, diarrhea, nausea and vomiting.   Genitourinary:  Negative for flank pain and hematuria.   Musculoskeletal:  Negative for joint swelling.   Skin:  Negative for pallor.   Neurological:  Negative for seizures and headaches.   Psychiatric/Behavioral:  The patient is nervous/anxious.    All other systems reviewed and are negative.       Physical Exam:  /74 (BP Location: Right arm, Patient Position: Lying)   Pulse 52   Temp 97.9 °F (36.6 °C) (Oral)   Resp 16   Ht 175.3 cm (69\")   Wt 113 kg (249 lb 9 oz)   SpO2 100%   BMI 36.85 kg/m²     Physical Exam  Constitutional:       Appearance: Normal appearance.   HENT:      Head: Normocephalic and atraumatic.      Nose: Nose normal.      Mouth/Throat:      Mouth: Mucous membranes are moist.   Eyes:      Extraocular Movements: Extraocular movements intact.      Conjunctiva/sclera: Conjunctivae normal.      Pupils: Pupils are equal, round, and reactive to light.   Cardiovascular:      Rate and Rhythm: Normal rate and regular rhythm.      Pulses: " Normal pulses.      Heart sounds: Normal heart sounds.   Pulmonary:      Effort: Pulmonary effort is normal.      Breath sounds: Normal breath sounds.   Abdominal:      General: There is no distension.      Palpations: Abdomen is soft.      Tenderness: There is no abdominal tenderness.   Musculoskeletal:         General: Normal range of motion.      Cervical back: Normal range of motion.   Skin:     General: Skin is warm and dry.      Capillary Refill: Capillary refill takes less than 2 seconds.   Neurological:      General: No focal deficit present.      Mental Status: She is alert and oriented to person, place, and time. Mental status is at baseline.   Psychiatric:         Mood and Affect: Mood normal.         Behavior: Behavior normal.                    Medical Decision Making:      Comorbidities that affect care:    Fibromyalgia, anxiety, Diabetes    External Notes reviewed:    Previous Clinic Note: Patient was seen by her PCP on 4/11/25 for hypertension, seasonal allergies and pain in right shin.      The following orders were placed and all results were independently analyzed by me:  Orders Placed This Encounter   Procedures    XR Chest 1 View    Comprehensive Metabolic Panel    CBC Auto Differential    CBC & Differential       Medications Given in the Emergency Department:  Medications   LORazepam (ATIVAN) tablet 1 mg (1 mg Oral Given 7/8/25 0633)        ED Course:         Labs:    Lab Results (last 24 hours)       Procedure Component Value Units Date/Time    CBC & Differential [[id number]]  (Abnormal) Collected: 07/08/25 0457    Specimen: Blood Updated: 07/08/25 0500    Narrative:      The following orders were created for panel order CBC & Differential.  Procedure                               Abnormality         Status                     ---------                               -----------         ------                     CBC Auto Differential[[id number]]        Abnormal            Final result                  Please view results for these tests on the individual orders.    Comprehensive Metabolic Panel [[id number]]  (Abnormal) Collected: 07/08/25 0457    Specimen: Blood Updated: 07/08/25 0531     Glucose 109 mg/dL      BUN 18.6 mg/dL      Creatinine 1.24 mg/dL      Sodium 130 mmol/L      Potassium 3.7 mmol/L      Chloride 96 mmol/L      CO2 22.3 mmol/L      Calcium 9.0 mg/dL      Total Protein 7.0 g/dL      Albumin 4.5 g/dL      ALT (SGPT) 18 U/L      AST (SGOT) 18 U/L      Alkaline Phosphatase 67 U/L      Total Bilirubin 0.3 mg/dL      Globulin 2.5 gm/dL      A/G Ratio 1.8 g/dL      BUN/Creatinine Ratio 15.0     Anion Gap 11.7 mmol/L      eGFR 53.8 mL/min/1.73     Narrative:      GFR Categories in Chronic Kidney Disease (CKD)              GFR Category          GFR (mL/min/1.73)    Interpretation  G1                    90 or greater        Normal or high (1)  G2                    60-89                Mild decrease (1)  G3a                   45-59                Mild to moderate decrease  G3b                   30-44                Moderate to severe decrease  G4                    15-29                Severe decrease  G5                    14 or less           Kidney failure    (1)In the absence of evidence of kidney disease, neither GFR category G1 or G2 fulfill the criteria for CKD.    eGFR calculation 2021 CKD-EPI creatinine equation, which does not include race as a factor    CBC Auto Differential [[id number]]  (Abnormal) Collected: 07/08/25 0457    Specimen: Blood Updated: 07/08/25 0509     WBC 6.12 10*3/mm3      RBC 3.84 10*6/mm3      Hemoglobin 11.0 g/dL      Hematocrit 33.2 %      MCV 86.5 fL      MCH 28.6 pg      MCHC 33.1 g/dL      RDW 11.9 %      RDW-SD 37.7 fl      MPV 9.3 fL      Platelets 323 10*3/mm3      Neutrophil % 59.7 %      Lymphocyte % 29.1 %      Monocyte % 7.2 %      Eosinophil % 3.1 %      Basophil % 0.7 %      Immature Grans % 0.2 %      Neutrophils, Absolute 3.66 10*3/mm3      Lymphocytes,  Absolute 1.78 10*3/mm3      Monocytes, Absolute 0.44 10*3/mm3      Eosinophils, Absolute 0.19 10*3/mm3      Basophils, Absolute 0.04 10*3/mm3      Immature Grans, Absolute 0.01 10*3/mm3      nRBC 0.0 /100 WBC              Imaging:    XR Chest 1 View  Result Date: 7/8/2025  XR CHEST 1 VW Date of Exam: 7/8/2025 6:40 AM EDT Indication: Cough and shortness of air. Comparison: 9/22/2023 Findings: Cardiac and mediastinal contours are normal. Pulmonary vascularity is normal. The lungs are clear. No pneumothorax.     No active disease. Electronically Signed: Tomer Roberts MD  7/8/2025 6:59 AM EDT  Workstation ID: [id number]        Differential Diagnosis and Discussion:    Dyspnea: Differential diagnosis includes but is not limited to metabolic acidosis, neurological disorders, psychogenic, asthma, pneumothorax, upper airway obstruction, COPD, pneumonia, noncardiogenic pulmonary edema, interstitial lung disease, anemia, congestive heart failure, and pulmonary embolism  Psychiatric: Differential diagnosis includes but is not limited to depression, psychosis, bipolar disorder, anxiety, manic episode, schizophrenia, and substance abuse.    PROCEDURES:    Labs were collected in the emergency department and all labs were reviewed and interpreted by me.  X-ray were performed in the emergency department and all X-ray impressions were independently interpreted by me.    No orders to display       Procedures    MDM  Number of Diagnoses or Management Options  Anxiety  Dyspnea, unspecified type  Diagnosis management comments: Patient is afebrile nontoxic-appearing.  Vital signs are stable.  At time of evaluation she is lying in bed in no acute distress.  Patient reports difficulty sleeping and feeling anxious.  Labs showed no significant abnormality.  Chest x-ray showed no acute finding.  Patient was given Ativan in the emergency department with which she states helped her symptoms.  Will give prescription for hydroxyzine.  Recommend  follow-up with her primary care provider.  Discussed return precautions, discharge instructions and answered all her questions.       Amount and/or Complexity of Data Reviewed  Clinical lab tests: reviewed  Tests in the radiology section of CPT®: reviewed  Review and summarize past medical records: yes  Independent visualization of images, tracings, or specimens: yes    Risk of Complications, Morbidity, and/or Mortality  Presenting problems: moderate  Management options: moderate                       Patient Care Considerations:    SEPSIS was considered but is NOT present in the emergency department as SIRS criteria is not present.      Consultants/Shared Management Plan:    None    Social Determinants of Health:    Patient is independent, reliable, and has access to care.       Disposition and Care Coordination:    Discharged: The patient is suitable and stable for discharge with no need for consideration of admission.    I have explained the patient´s condition, diagnoses and treatment plan based on the information available to me at this time. I have answered questions and addressed any concerns. The patient has a good  understanding of the patient´s diagnosis, condition, and treatment plan as can be expected at this point. The vital signs have been stable. The patient´s condition is stable and appropriate for discharge from the emergency department.      The patient will pursue further outpatient evaluation with the primary care physician or other designated or consulting physician as outlined in the discharge instructions. They are agreeable to this plan of care and follow-up instructions have been explained in detail. The patient has received these instructions in written format and has expressed an understanding of the discharge instructions. The patient is aware that any significant change in condition or worsening of symptoms should prompt an immediate return to this or the closest emergency department or  call to 911.  I have explained discharge medications and the need for follow up with the patient/caretakers. This was also printed in the discharge instructions. Patient was discharged with the following medications and follow up:      Medication List        New Prescriptions      hydrOXYzine 25 MG tablet  Commonly known as: ATARAX  Take 1 tablet by mouth 3 (Three) Times a Day As Needed for Anxiety.               Where to Get Your Medications        These medications were sent to [facility]/pharmacy #[id number] - [place], [place] - [address] - [phone] [facility] [phone] FX  [address] [place] [address]      Hours: 24-hours Phone: [phone]   hydrOXYzine 25 MG tablet      Pat Krishna PA  [address]  [address]  [place] [id number] [phone]    In 2 days         Final diagnoses:   Anxiety   Dyspnea, unspecified type        ED Disposition       ED Disposition   Discharge    Condition   Stable    Comment   --               This medical record created using voice recognition software.             Dontrell Cabrera MD  07/08/25 9709